# Patient Record
Sex: FEMALE | Race: WHITE | NOT HISPANIC OR LATINO | Employment: FULL TIME | URBAN - METROPOLITAN AREA
[De-identification: names, ages, dates, MRNs, and addresses within clinical notes are randomized per-mention and may not be internally consistent; named-entity substitution may affect disease eponyms.]

---

## 2023-03-02 PROBLEM — R32 URINARY INCONTINENCE: Status: ACTIVE | Noted: 2023-03-02

## 2023-03-02 PROBLEM — R32 INCONTINENCE: Status: ACTIVE | Noted: 2023-03-02

## 2023-03-02 PROBLEM — Z20.822 SUSPECTED COVID-19 VIRUS INFECTION: Status: ACTIVE | Noted: 2023-03-02

## 2023-03-02 PROBLEM — R92.1 BREAST CALCIFICATIONS: Status: ACTIVE | Noted: 2023-03-02

## 2023-03-02 PROBLEM — H52.203 ASTIGMATISM, BILATERAL: Status: ACTIVE | Noted: 2023-03-02

## 2023-03-02 PROBLEM — B34.9 VIRAL ILLNESS: Status: ACTIVE | Noted: 2023-03-02

## 2023-03-02 PROBLEM — E55.9 VITAMIN D INSUFFICIENCY: Status: ACTIVE | Noted: 2023-03-02

## 2023-03-02 PROBLEM — U07.1 COVID-19 VIRUS INFECTION: Status: ACTIVE | Noted: 2023-03-02

## 2023-03-02 PROBLEM — G56.02 LEFT CARPAL TUNNEL SYNDROME: Status: ACTIVE | Noted: 2023-03-02

## 2023-03-02 PROBLEM — G43.909 MIGRAINE: Status: ACTIVE | Noted: 2023-03-02

## 2023-03-02 PROBLEM — R31.29 MICROSCOPIC HEMATURIA: Status: ACTIVE | Noted: 2023-03-02

## 2023-03-02 PROBLEM — F41.9 ANXIETY: Status: ACTIVE | Noted: 2023-03-02

## 2023-03-02 PROBLEM — E53.8 VITAMIN B12 DEFICIENCY: Status: ACTIVE | Noted: 2023-03-02

## 2023-03-02 PROBLEM — R05.9 COUGH: Status: ACTIVE | Noted: 2023-03-02

## 2023-03-02 PROBLEM — S16.1XXA NECK STRAIN: Status: ACTIVE | Noted: 2023-03-02

## 2023-03-02 PROBLEM — R11.2 NAUSEA WITH VOMITING: Status: ACTIVE | Noted: 2023-03-02

## 2023-03-02 PROBLEM — G47.33 SEVERE OBSTRUCTIVE SLEEP APNEA: Status: ACTIVE | Noted: 2023-03-02

## 2023-03-02 PROBLEM — E66.2 CLASS 2 OBESITY WITH ALVEOLAR HYPOVENTILATION AND BODY MASS INDEX (BMI) OF 38.0 TO 38.9 IN ADULT (MULTI): Status: ACTIVE | Noted: 2023-03-02

## 2023-03-02 PROBLEM — K51.211 ULCERATIVE PROCTITIS WITH RECTAL BLEEDING (MULTI): Status: ACTIVE | Noted: 2023-03-02

## 2023-03-02 PROBLEM — E66.9 OBESITY, CLASS II, BMI 35-39.9: Status: ACTIVE | Noted: 2023-03-02

## 2023-03-02 PROBLEM — E78.5 DYSLIPIDEMIA: Status: ACTIVE | Noted: 2023-03-02

## 2023-03-02 PROBLEM — N91.2 AMENORRHEA: Status: ACTIVE | Noted: 2023-03-02

## 2023-03-02 PROBLEM — H52.223 MYOPIA OF BOTH EYES WITH REGULAR ASTIGMATISM: Status: ACTIVE | Noted: 2023-03-02

## 2023-03-02 PROBLEM — I10 HBP (HIGH BLOOD PRESSURE): Status: ACTIVE | Noted: 2023-03-02

## 2023-03-02 PROBLEM — H52.13 MYOPIA OF BOTH EYES WITH REGULAR ASTIGMATISM: Status: ACTIVE | Noted: 2023-03-02

## 2023-03-02 PROBLEM — R92.8 ABNORMAL MAMMOGRAM: Status: ACTIVE | Noted: 2023-03-02

## 2023-03-02 PROBLEM — R09.81 SINUS CONGESTION: Status: ACTIVE | Noted: 2023-03-02

## 2023-03-02 PROBLEM — E66.812 CLASS 2 OBESITY WITH ALVEOLAR HYPOVENTILATION AND BODY MASS INDEX (BMI) OF 38.0 TO 38.9 IN ADULT: Status: ACTIVE | Noted: 2023-03-02

## 2023-03-02 PROBLEM — R63.5 WEIGHT GAIN: Status: ACTIVE | Noted: 2023-03-02

## 2023-03-02 PROBLEM — N39.0 UTI (URINARY TRACT INFECTION): Status: ACTIVE | Noted: 2023-03-02

## 2023-03-02 PROBLEM — G47.30 SLEEP DISORDER BREATHING: Status: ACTIVE | Noted: 2023-03-02

## 2023-03-02 PROBLEM — G56.03 BILATERAL CARPAL TUNNEL SYNDROME: Status: ACTIVE | Noted: 2023-03-02

## 2023-03-02 PROBLEM — R20.2 PARESTHESIA OF ARM: Status: ACTIVE | Noted: 2023-03-02

## 2023-03-02 PROBLEM — E88.810 METABOLIC SYNDROME: Status: ACTIVE | Noted: 2023-03-02

## 2023-03-02 PROBLEM — H10.13 ALLERGIC CONJUNCTIVITIS OF BOTH EYES: Status: ACTIVE | Noted: 2023-03-02

## 2023-03-02 PROBLEM — T14.8XXA PULLED MUSCLE: Status: ACTIVE | Noted: 2023-03-02

## 2023-03-02 PROBLEM — R92.2 DENSE BREASTS: Status: ACTIVE | Noted: 2023-03-02
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
## 2023-03-02 PROBLEM — N60.01 BREAST CYST, RIGHT: Status: ACTIVE | Noted: 2023-03-02

## 2023-03-02 PROBLEM — S63.501A SPRAIN OF RIGHT WRIST: Status: ACTIVE | Noted: 2023-03-02

## 2023-03-02 PROBLEM — E66.812 OBESITY, CLASS II, BMI 35-39.9: Status: ACTIVE | Noted: 2023-03-02

## 2023-03-02 PROBLEM — R53.83 FATIGUE: Status: ACTIVE | Noted: 2023-03-02

## 2023-03-02 PROBLEM — R92.30 DENSE BREASTS: Status: ACTIVE | Noted: 2023-03-02

## 2023-03-02 PROBLEM — J10.1 INFLUENZA A: Status: ACTIVE | Noted: 2023-03-02

## 2023-03-02 PROBLEM — M54.50 LOW BACK PAIN, EPISODIC: Status: ACTIVE | Noted: 2023-03-02

## 2023-03-02 PROBLEM — M47.812 OSTEOARTHRITIS CERVICAL SPINE: Status: ACTIVE | Noted: 2023-03-02

## 2023-03-02 PROBLEM — B34.9 VIRAL ILLNESS: Status: RESOLVED | Noted: 2023-03-02 | Resolved: 2023-03-02

## 2023-03-02 PROBLEM — J06.9 URI (UPPER RESPIRATORY INFECTION): Status: ACTIVE | Noted: 2023-03-02

## 2023-03-02 PROBLEM — E78.1 HYPERTRIGLYCERIDEMIA: Status: ACTIVE | Noted: 2023-03-02

## 2023-03-02 PROBLEM — M54.9 BACKACHE: Status: ACTIVE | Noted: 2023-03-02

## 2023-03-02 RX ORDER — POTASSIUM CHLORIDE 20 MEQ/1
TABLET, EXTENDED RELEASE ORAL
COMMUNITY
End: 2023-11-29 | Stop reason: WASHOUT

## 2023-03-02 RX ORDER — ATENOLOL 50 MG/1
1 TABLET ORAL DAILY
COMMUNITY
Start: 2018-05-23 | End: 2023-03-17 | Stop reason: SDUPTHER

## 2023-03-02 RX ORDER — CHLORTHALIDONE 25 MG/1
1 TABLET ORAL DAILY
COMMUNITY
Start: 2021-05-05 | End: 2023-03-17 | Stop reason: SDUPTHER

## 2023-03-02 RX ORDER — MESALAMINE 0.38 G/1
CAPSULE, EXTENDED RELEASE ORAL
COMMUNITY
Start: 2019-11-21

## 2023-03-02 RX ORDER — FLUTICASONE PROPIONATE 50 MCG
2 SPRAY, SUSPENSION (ML) NASAL DAILY
COMMUNITY
Start: 2022-10-05 | End: 2024-01-11 | Stop reason: SDUPTHER

## 2023-03-02 RX ORDER — LEVONORGESTREL 52 MG/1
INTRAUTERINE DEVICE INTRAUTERINE
COMMUNITY
Start: 2021-03-23

## 2023-03-02 RX ORDER — BUSPIRONE HYDROCHLORIDE 10 MG/1
1 TABLET ORAL 2 TIMES DAILY
COMMUNITY
Start: 2022-07-27 | End: 2023-11-01 | Stop reason: SDUPTHER

## 2023-03-02 RX ORDER — MESALAMINE 1000 MG/1
SUPPOSITORY RECTAL
COMMUNITY
Start: 2021-02-15

## 2023-03-02 RX ORDER — RIZATRIPTAN BENZOATE 10 MG/1
TABLET ORAL
COMMUNITY
Start: 2021-05-05

## 2023-03-02 RX ORDER — TOPIRAMATE 50 MG/1
TABLET, FILM COATED ORAL
COMMUNITY
Start: 2022-10-11

## 2023-03-02 RX ORDER — CHOLECALCIFEROL (VITAMIN D3) 50 MCG
1 TABLET ORAL DAILY
COMMUNITY
End: 2023-03-17 | Stop reason: SDUPTHER

## 2023-03-02 RX ORDER — HYDROXYZINE HYDROCHLORIDE 10 MG/1
10 TABLET, FILM COATED ORAL 2 TIMES DAILY
COMMUNITY
Start: 2022-03-25

## 2023-03-17 DIAGNOSIS — E55.9 VITAMIN D DEFICIENCY: ICD-10-CM

## 2023-03-17 DIAGNOSIS — G43.809 OTHER MIGRAINE WITHOUT STATUS MIGRAINOSUS, NOT INTRACTABLE: ICD-10-CM

## 2023-03-17 DIAGNOSIS — I10 HYPERTENSION, UNSPECIFIED TYPE: Primary | ICD-10-CM

## 2023-03-19 RX ORDER — ATENOLOL 50 MG/1
50 TABLET ORAL DAILY
Qty: 30 TABLET | Refills: 2 | Status: SHIPPED | OUTPATIENT
Start: 2023-03-19 | End: 2023-11-29 | Stop reason: WASHOUT

## 2023-03-19 RX ORDER — CHOLECALCIFEROL (VITAMIN D3) 50 MCG
50 TABLET ORAL DAILY
Qty: 30 TABLET | Refills: 2 | Status: SHIPPED | OUTPATIENT
Start: 2023-03-19 | End: 2023-04-18

## 2023-03-19 RX ORDER — CHLORTHALIDONE 25 MG/1
25 TABLET ORAL DAILY
Qty: 30 TABLET | Refills: 1 | Status: SHIPPED | OUTPATIENT
Start: 2023-03-19 | End: 2023-11-29 | Stop reason: WASHOUT

## 2023-04-24 ENCOUNTER — HOSPITAL ENCOUNTER (OUTPATIENT)
Dept: DATA CONVERSION | Facility: HOSPITAL | Age: 48
End: 2023-04-24
Attending: ORTHOPAEDIC SURGERY | Admitting: ORTHOPAEDIC SURGERY
Payer: COMMERCIAL

## 2023-04-24 DIAGNOSIS — I10 ESSENTIAL (PRIMARY) HYPERTENSION: ICD-10-CM

## 2023-04-24 DIAGNOSIS — E66.9 OBESITY, UNSPECIFIED: ICD-10-CM

## 2023-04-24 DIAGNOSIS — G56.01 CARPAL TUNNEL SYNDROME, RIGHT UPPER LIMB: ICD-10-CM

## 2023-04-24 DIAGNOSIS — G47.33 OBSTRUCTIVE SLEEP APNEA (ADULT) (PEDIATRIC): ICD-10-CM

## 2023-05-24 ENCOUNTER — OFFICE VISIT (OUTPATIENT)
Dept: PRIMARY CARE | Facility: CLINIC | Age: 48
End: 2023-05-24
Payer: COMMERCIAL

## 2023-05-24 VITALS
SYSTOLIC BLOOD PRESSURE: 128 MMHG | DIASTOLIC BLOOD PRESSURE: 70 MMHG | WEIGHT: 233 LBS | HEART RATE: 72 BPM | TEMPERATURE: 97.5 F | HEIGHT: 66 IN | RESPIRATION RATE: 16 BRPM | BODY MASS INDEX: 37.45 KG/M2

## 2023-05-24 DIAGNOSIS — G56.01 CARPAL TUNNEL SYNDROME OF RIGHT WRIST: Primary | ICD-10-CM

## 2023-05-24 PROBLEM — J01.90 SINUSITIS, ACUTE: Status: ACTIVE | Noted: 2023-05-24

## 2023-05-24 PROBLEM — J20.9 BRONCHITIS, ACUTE: Status: ACTIVE | Noted: 2023-05-24

## 2023-05-24 PROBLEM — E87.6 HYPOKALEMIA: Status: ACTIVE | Noted: 2023-05-24

## 2023-05-24 PROCEDURE — 3074F SYST BP LT 130 MM HG: CPT | Performed by: INTERNAL MEDICINE

## 2023-05-24 PROCEDURE — 99213 OFFICE O/P EST LOW 20 MIN: CPT | Performed by: INTERNAL MEDICINE

## 2023-05-24 PROCEDURE — 3008F BODY MASS INDEX DOCD: CPT | Performed by: INTERNAL MEDICINE

## 2023-05-24 PROCEDURE — 3078F DIAST BP <80 MM HG: CPT | Performed by: INTERNAL MEDICINE

## 2023-05-24 RX ORDER — HYDROCODONE BITARTRATE AND ACETAMINOPHEN 5; 325 MG/1; MG/1
TABLET ORAL
COMMUNITY
Start: 2023-04-24 | End: 2023-11-29 | Stop reason: WASHOUT

## 2023-05-24 RX ORDER — CHOLECALCIFEROL (VITAMIN D3) 50 MCG
1 TABLET ORAL DAILY
COMMUNITY
End: 2024-06-10 | Stop reason: SDUPTHER

## 2023-05-24 RX ORDER — UBIDECARENONE 75 MG
1 CAPSULE ORAL DAILY
COMMUNITY
Start: 2022-10-29 | End: 2024-06-10 | Stop reason: SDUPTHER

## 2023-05-24 ASSESSMENT — ENCOUNTER SYMPTOMS
DYSURIA: 0
FATIGUE: 1
ABDOMINAL PAIN: 0
DIARRHEA: 0
PALPITATIONS: 0
DIZZINESS: 0
CONFUSION: 0
HEADACHES: 1
NAUSEA: 0
WEAKNESS: 0
VOMITING: 0
SHORTNESS OF BREATH: 0
ADENOPATHY: 0
CHEST TIGHTNESS: 0
SORE THROAT: 0
ARTHRALGIAS: 0
JOINT SWELLING: 0
COUGH: 0
CHILLS: 0
CARDIOVASCULAR NEGATIVE: 1
CONSTIPATION: 0
UNEXPECTED WEIGHT CHANGE: 0
SLEEP DISTURBANCE: 0
WHEEZING: 0
RESPIRATORY NEGATIVE: 1

## 2023-05-24 ASSESSMENT — PATIENT HEALTH QUESTIONNAIRE - PHQ9
1. LITTLE INTEREST OR PLEASURE IN DOING THINGS: NOT AT ALL
2. FEELING DOWN, DEPRESSED OR HOPELESS: NOT AT ALL
SUM OF ALL RESPONSES TO PHQ9 QUESTIONS 1 AND 2: 0

## 2023-05-24 NOTE — PROGRESS NOTES
Subjective   Tanisha Stewart is a 47 y.o. female who presents for Follow-up (R hand surgery follow up 4.24.2023).  Patient here today follow up R hand carpal tunnel SX - 4.24.2023, she had off work since SX till 5.23.2023, went to see surgeon 5.17.23 , was telling him the hand still hurting and has some discharge at that time still had 3 stitches , today  is only 1 left and the surgeon to9d patient she could go9 back to work starting yesterday 5.23.2023, patient told him can not go yet due to still has numbness ,sore, and she can  not pull or lift heavy weights at work as it is her job requirements , dr told patient can give her return to work  with lifting restrictions but patient states her edna does not have light duty , at work the HR and manager asked patient if she feels she can hurt herself by pulling heavy stuff , and patient  answer yes, she does not fell enough healed to be able to work with heavy objects. Work place  advise patient to see the PCP for more week  work excuse     Is crying, is afraid to go back to work and hurt her back or something because she is still has pain  and she needs to lift and work with that hand. Will refer to PT/OT for eval  Review of Systems   Constitutional:  Positive for fatigue. Negative for chills and unexpected weight change.        Comment   HENT:  Negative for congestion, ear pain and sore throat.    Respiratory: Negative.  Negative for cough, chest tightness, shortness of breath and wheezing.    Cardiovascular: Negative.  Negative for palpitations and leg swelling.   Gastrointestinal:  Negative for abdominal pain, constipation, diarrhea, nausea and vomiting.   Genitourinary:  Negative for dysuria and urgency.   Musculoskeletal:  Negative for arthralgias and joint swelling.   Skin:  Negative for rash.   Neurological:  Positive for headaches. Negative for dizziness and weakness.   Hematological:  Negative for adenopathy.   Psychiatric/Behavioral:  Negative for confusion  "and sleep disturbance.        Objective   Physical Exam  Constitutional:       Appearance: Normal appearance.      Comments: Post sx scar and mild swelling around , pain to palpation , no signs of infection, no stiches   HENT:      Head: Normocephalic and atraumatic.   Eyes:      Pupils: Pupils are equal, round, and reactive to light.   Cardiovascular:      Rate and Rhythm: Normal rate and regular rhythm.   Pulmonary:      Effort: Pulmonary effort is normal.      Breath sounds: Normal breath sounds.   Musculoskeletal:         General: Normal range of motion.      Cervical back: Normal range of motion and neck supple.   Skin:     General: Skin is warm.   Neurological:      General: No focal deficit present.      Mental Status: She is alert and oriented to person, place, and time.   Psychiatric:         Mood and Affect: Mood normal.         Behavior: Behavior normal.       /70 (BP Location: Left arm, Patient Position: Sitting)   Pulse 72   Temp 36.4 °C (97.5 °F)   Resp 16   Ht 1.676 m (5' 6\")   Wt 106 kg (233 lb)   BMI 37.61 kg/m²       Assessment/Plan   Problem List Items Addressed This Visit    None      "

## 2023-05-31 ENCOUNTER — TELEPHONE (OUTPATIENT)
Dept: PRIMARY CARE | Facility: CLINIC | Age: 48
End: 2023-05-31
Payer: COMMERCIAL

## 2023-05-31 NOTE — TELEPHONE ENCOUNTER
5/31/23: I spoke to Joleen at Dr. Cheng's office after faxing them a copy of the OT evaluation.  She confirmed that they will in fact write a letter for the Pt and that this is their responsibility because Dr. Cheng is her surgeon.  They said we do not need to do anything further. I messaged the patient via [facility] to explain this to her.

## 2023-05-31 NOTE — TELEPHONE ENCOUNTER
Hi Elinor, I just got off the phone with Dr. Ramos and he said that they do not write any work notes that would be up to my primary doctor. All they can do is tell the primary doctor my prognosis and right now my prognosis is that I cannot return to work because of my weak hand and not able to perform my job . When I gave my results to my employer, he told me not to return until my hand is stronger so I need a letter from my primary showing that I am still out of work until my hand gets stronger. I am doing the excersises Dr. Ramos  gave me 5x a day to do . He says that you can call him and he will explain that to you  why she needs to extend my time . Once my therapy is complete and my percentage is where it needs to be than a form needs to be completed saying that I can return to work. This is what [employer] is asking me to do.    I hope this helps      ----- Message -----       From:Tanisha Stewart       Sent:5/31/2023  3:24 PM EDT         To:User Message Message List    Subject:Occupational Therapy    This whole thing has been nothing but a nightmare back-and-forth from one doctor to Another.  one says go to this doctor. The other ones says  to go to this doctor. My surgeon has been a total idiot, saying that there is nothing wrong with my hand he tells me to go to my primary my primary sends me to an occupational therapist. My occupational therapist tells me to come back to my primary my primary tells me to go back to my occupational therapist. I mean I just don’t understand what the problem is. I told my employment what the results from the therapist were and he  told me not to return until I can lift up the amount that I am suppose to .I’m doing everything that everybody’s telling me to do. Obviously there’s a problem with my hands and I can’t do my work. so you suggesting I called  again

## 2023-05-31 NOTE — TELEPHONE ENCOUNTER
Good morning just wanted to let you know that I did go to my appointment this morning. He did some work with me on my hand and I could lift up to 17 pounds with my right hand. Needs a lot of work to be done. He did say that I do have some fluid in my hand and he does see that I do have a lot of pain and need to do some work on my hand. He gave me some excersizes to do . I’m scheduled in two weeks to go back . didn’t really say if I. I should stay out of work. I did tell him that I have a return to work letter to go back on Monday. I am wearing a brace so I guess we’ll see how it goes.  If  Dr. NORTON thinks I should go back on Monday then I will go back on Monday but I will continue with the therapy until my hand is 100% stronger . She can see his follow up notes in my chart if needed .   Good morning I talk to my employer at [employer] and he’s concerned about me returning to work on Monday because of the results from the occupational therapist stating that I am not able to lift more than 18 pounds on my right hand. I contacted you right after my appointment on Want me returning to work unless I am able to lift properly with both hands with that being said I need an excuse either from you or the therapist stating I can’t go back to work until my hands are ready to work. Do I get that from you or do I get that from Dr. Ramos please advise you can also see Dr. Newby notes from my therapy session. I am also seeing him on June 6.

## 2023-06-02 ENCOUNTER — TELEPHONE (OUTPATIENT)
Dept: PRIMARY CARE | Facility: CLINIC | Age: 48
End: 2023-06-02
Payer: COMMERCIAL

## 2023-06-02 NOTE — TELEPHONE ENCOUNTER
My apologies it’s May 30- June 5 for the new excuse  . She excused  me from May 24-May 29 and referred me to Dr RAMOS .         Tanisha Stewart  You14 hours ago (8:07 PM)       Good evening      After contacting Dr. Cheng, his assistant has been very rude and All she kept saying was that I should’ve gone back to work, and [employer] should have put me on a light load and taking care of me assisted what  I needed and I kept explaining to her that I could not return to work on a light duty or anything with restrictions. first of all, they would not let me come back to work and second of all, I can’t come to work, and then leave off of work with an injury, which made no sense at all. when I go to see Dr. Ramos on June 6 I’m hoping my hand will be where it needs to be and I’m hoping he will send me back to work on June 7. Is there anyway Dr. Zabala  can help me excuse that time since she excuse my first week and is aware of the situation, I am tired of going back-and-forth from one doctor another I was told to see my primary doctor which I did, and she is aware of my situation. She sent me to occupational therapy that therapist will do nothing for me. He says for me to see my primary doctor , at this point, I’m not sure what to do Dr. NORTON  was already aware of my situation. At this point I do need to get back to work and I can’t keep going back-and-forth with all these doctors I just need somebody to give me an excuse for May 29, [place] 5 and on June 6. I will see Dr. Ramos hope I can lift up to 65 pounds and go back to work whatever you can do whatever Dr. allison do. I’d appreciate it. Have a good evening.

## 2023-06-08 ENCOUNTER — TELEPHONE (OUTPATIENT)
Dept: PRIMARY CARE | Facility: CLINIC | Age: 48
End: 2023-06-08
Payer: COMMERCIAL

## 2023-06-08 NOTE — TELEPHONE ENCOUNTER
We received [facility] Paperwork form the Patient for her absences from work May 29 to June 6th.  I spoke to Joleen at Dr. Cheng's office. Joleen explained that She spoke to the patient's employer [employer] and told them that Dr. Cheng ( the surgeon ) cleared The patient to return to work on May 23rd with no use of her hands.  [employer] did say that Tanisha is not allowed to come to work if she cannot use her hands.  [employer]oleen told [employer] that is on them whether or not they want to allow her to return to work: Dr. Cheng is not allowed to say anything more than she can return to work without use of hands. Joleen told me she spoke to the patient on 6/7/23 and that the patient said she was having the OT send a letter.  Instead the patient sent us [facility] paperwork.  Joleen said they will give her a letter stating she was clear to return to work 5/23/23 without the use of her hands and that they will not fill out any paperwork. The patient has been aware of this from the start.

## 2023-06-12 ENCOUNTER — TELEPHONE (OUTPATIENT)
Dept: PRIMARY CARE | Facility: CLINIC | Age: 48
End: 2023-06-12
Payer: COMMERCIAL

## 2023-06-12 NOTE — TELEPHONE ENCOUNTER
Dalton Aldrich, as I suspected this is what I got back from [facility] the forms do you need to be completed all Dr. Kline hast to do is fill out the proper steps bypass the part that says FMLA because it does not apply to me and state the same things that I had to see her go to my OT and just fax with [employer]. That’s all she needs to do. I don’t understand why she’s having a problem with it. She can also contact [facility] if she has any questions it Jose to be on their format of paperwork in order for them to except it. This is the reply that I got back from [facility], can she please just complete the form and fax it back    Attachments   [id number]       Katia Beltran CMA routed conversation to You; Jean Van MD3 days ago     Tanisha HUANG Do [id number] [id number] Clinical Support Staff (supporting You)3 days ago       Thank you and just to make a correction to your notes I am not on FMLA paperwork  and I did not request or send you  FMLA at [facility]. I am on medical leave. It’s called [facility]. I never have requested FMLA. I don’t have enough hours for it if Sagrario would have read the paperwork correctly, it is an option that you would have checkmarked on it so if Dr. Kline would’ve read the paperwork correctly it was an option to check on it if I was on FMLA which I was not . Needless to say it’s a shame that it had to come to this point all she had to do is read the paperwork and skip over the part  that said FMLA, which does not pertain to me anyways, I’m hoping that [facility] will except her letter and we could be done with this .     Thank you        You  Tanisha Rolles3 days ago     MELLISSA Garay,     I just emailed you a copy.      Elinor Altamirano CMA routed conversation to You3 days ago     Tanisha HUANG Do [id number] Staff (supporting You)3 days ago       PleSe email a copy of the letter so I can print it      Thank you

## 2023-06-12 NOTE — TELEPHONE ENCOUNTER
Tanisha,     Please see below for Dr. Van's final response.         Jean Van MD  YouJust now (12:15 PM)        Dalton Garay,  I am not going to go back and forth on this anymore. I am not filing out the papers because is asking me to say more then is medically necessary for me to say.  The OT evaluation , I reviewed both of them at the time, and doesn't say that you should stay home only what you are able to do and what is the treatment to get better and that was the reason I sent you to OT.  The company is suppose to accommodate your work until you are able to do the prior work in full.  Sorry but this is all that I can say.

## 2023-06-12 NOTE — TELEPHONE ENCOUNTER
Good Morning      I cannot understand why she doesn’t understand . The surgeon case it closed . There are 2 cases  that we weren’t aware , the surgery closes on the 5/23 when they didn’t want to extend me . When I saw her on the 5/24 new case opens thats all they care about is why I came to see you , that you sent me to OT , OT evaluated me you saw the notes and they allowed me to return on 6/6. They told me all this was sent to kim Zabala to review , so why is it so hard for her to review the same thing she wrote on her letter head ,on the [employer] form and fax it in ? This is a completely inappropriate and very frustrating that a Dr cannot corporate with a simple request . [employer] can’t accept the format of the letter it has to be on their paper work and it’s not FMLA I sent a phone number to call maybe they can explain it better so we can clear this up

## 2023-06-12 NOTE — TELEPHONE ENCOUNTER
Message forwarded to flip Garay,     Please read below for Dr. Van's message:      Jean Van MD  You1 minute ago (11:14 AM)        The form  would prompt me to say that she couldn't work during that time.  1) I can't say that, the OT glenn is saying that she couldn't lift at 100 % at the first visit , not that she couldn't work at all.  So ,for me , stating that from medical reasons I prohibit her to work is not true.  I gave her few days off until she has been evaluated but after that I didn't say she should stay home, her employment should  accommodate her for the work she could do.  2) this is not a condition that I took care off, is an other doctor , so the issue should be solved by the physician who was giving the treatment ( surgery). Not me. I just got in the middle trying to help.  3)  I gave you the letter stating the facts , I can't fill out a form asking me to say more than I can say from a medical point of view.       This  MyChart message has not been read.

## 2023-07-19 LAB
ALANINE AMINOTRANSFERASE (SGPT) (U/L) IN SER/PLAS: 13 U/L (ref 7–45)
ALBUMIN (G/DL) IN SER/PLAS: 4.2 G/DL (ref 3.4–5)
ALKALINE PHOSPHATASE (U/L) IN SER/PLAS: 48 U/L (ref 33–110)
ANION GAP IN SER/PLAS: 10 MMOL/L (ref 10–20)
ASPARTATE AMINOTRANSFERASE (SGOT) (U/L) IN SER/PLAS: 25 U/L (ref 9–39)
BASOPHILS (10*3/UL) IN BLOOD BY AUTOMATED COUNT: 0.12 X10E9/L (ref 0–0.1)
BASOPHILS/100 LEUKOCYTES IN BLOOD BY AUTOMATED COUNT: 1.5 % (ref 0–2)
BILIRUBIN TOTAL (MG/DL) IN SER/PLAS: 0.7 MG/DL (ref 0–1.2)
CALCIDIOL (25 OH VITAMIN D3) (NG/ML) IN SER/PLAS: 28 NG/ML
CALCIUM (MG/DL) IN SER/PLAS: 8.8 MG/DL (ref 8.6–10.3)
CARBON DIOXIDE, TOTAL (MMOL/L) IN SER/PLAS: 25 MMOL/L (ref 21–32)
CHLORIDE (MMOL/L) IN SER/PLAS: 109 MMOL/L (ref 98–107)
CHOLESTEROL (MG/DL) IN SER/PLAS: 176 MG/DL (ref 0–199)
CHOLESTEROL IN HDL (MG/DL) IN SER/PLAS: 46.7 MG/DL
CHOLESTEROL/HDL RATIO: 3.8
COBALAMIN (VITAMIN B12) (PG/ML) IN SER/PLAS: 190 PG/ML (ref 211–911)
CREATININE (MG/DL) IN SER/PLAS: 1 MG/DL (ref 0.5–1.05)
EOSINOPHILS (10*3/UL) IN BLOOD BY AUTOMATED COUNT: 0.19 X10E9/L (ref 0–0.7)
EOSINOPHILS/100 LEUKOCYTES IN BLOOD BY AUTOMATED COUNT: 2.4 % (ref 0–6)
ERYTHROCYTE DISTRIBUTION WIDTH (RATIO) BY AUTOMATED COUNT: 13 % (ref 11.5–14.5)
ERYTHROCYTE MEAN CORPUSCULAR HEMOGLOBIN CONCENTRATION (G/DL) BY AUTOMATED: 32.2 G/DL (ref 32–36)
ERYTHROCYTE MEAN CORPUSCULAR VOLUME (FL) BY AUTOMATED COUNT: 89 FL (ref 80–100)
ERYTHROCYTES (10*6/UL) IN BLOOD BY AUTOMATED COUNT: 4.69 X10E12/L (ref 4–5.2)
ESTIMATED AVERAGE GLUCOSE FOR HBA1C: 105 MG/DL
GFR FEMALE: 69 ML/MIN/1.73M2
GLUCOSE (MG/DL) IN SER/PLAS: 93 MG/DL (ref 74–99)
HEMATOCRIT (%) IN BLOOD BY AUTOMATED COUNT: 41.6 % (ref 36–46)
HEMOGLOBIN (G/DL) IN BLOOD: 13.4 G/DL (ref 12–16)
HEMOGLOBIN A1C/HEMOGLOBIN TOTAL IN BLOOD: 5.3 %
IMMATURE GRANULOCYTES/100 LEUKOCYTES IN BLOOD BY AUTOMATED COUNT: 0.1 % (ref 0–0.9)
LDL: 110 MG/DL (ref 0–99)
LEUKOCYTES (10*3/UL) IN BLOOD BY AUTOMATED COUNT: 8.1 X10E9/L (ref 4.4–11.3)
LYMPHOCYTES (10*3/UL) IN BLOOD BY AUTOMATED COUNT: 2.93 X10E9/L (ref 1.2–4.8)
LYMPHOCYTES/100 LEUKOCYTES IN BLOOD BY AUTOMATED COUNT: 36.3 % (ref 13–44)
MONOCYTES (10*3/UL) IN BLOOD BY AUTOMATED COUNT: 0.58 X10E9/L (ref 0.1–1)
MONOCYTES/100 LEUKOCYTES IN BLOOD BY AUTOMATED COUNT: 7.2 % (ref 2–10)
NEUTROPHILS (10*3/UL) IN BLOOD BY AUTOMATED COUNT: 4.25 X10E9/L (ref 1.2–7.7)
NEUTROPHILS/100 LEUKOCYTES IN BLOOD BY AUTOMATED COUNT: 52.5 % (ref 40–80)
PLATELETS (10*3/UL) IN BLOOD AUTOMATED COUNT: 344 X10E9/L (ref 150–450)
POTASSIUM (MMOL/L) IN SER/PLAS: 3.6 MMOL/L (ref 3.5–5.3)
PROTEIN TOTAL: 7.2 G/DL (ref 6.4–8.2)
SODIUM (MMOL/L) IN SER/PLAS: 140 MMOL/L (ref 136–145)
THYROTROPIN (MIU/L) IN SER/PLAS BY DETECTION LIMIT <= 0.05 MIU/L: 1.92 MIU/L (ref 0.44–3.98)
TRIGLYCERIDE (MG/DL) IN SER/PLAS: 95 MG/DL (ref 0–149)
UREA NITROGEN (MG/DL) IN SER/PLAS: 19 MG/DL (ref 6–23)
VLDL: 19 MG/DL (ref 0–40)

## 2023-08-02 ENCOUNTER — APPOINTMENT (OUTPATIENT)
Dept: PRIMARY CARE | Facility: CLINIC | Age: 48
End: 2023-08-02
Payer: COMMERCIAL

## 2023-08-28 LAB
ANION GAP IN SER/PLAS: 11 MMOL/L (ref 10–20)
CALCIUM (MG/DL) IN SER/PLAS: 8.9 MG/DL (ref 8.6–10.3)
CARBON DIOXIDE, TOTAL (MMOL/L) IN SER/PLAS: 32 MMOL/L (ref 21–32)
CHLORIDE (MMOL/L) IN SER/PLAS: 97 MMOL/L (ref 98–107)
CREATININE (MG/DL) IN SER/PLAS: 1.01 MG/DL (ref 0.5–1.05)
GFR FEMALE: 68 ML/MIN/1.73M2
GLUCOSE (MG/DL) IN SER/PLAS: 112 MG/DL (ref 74–99)
MAGNESIUM (MG/DL) IN SER/PLAS: 2.17 MG/DL (ref 1.6–2.4)
POTASSIUM (MMOL/L) IN SER/PLAS: 2.9 MMOL/L (ref 3.5–5.3)
SODIUM (MMOL/L) IN SER/PLAS: 137 MMOL/L (ref 136–145)
UREA NITROGEN (MG/DL) IN SER/PLAS: 21 MG/DL (ref 6–23)

## 2023-09-05 LAB
ANION GAP IN SER/PLAS: 12 MMOL/L (ref 10–20)
CALCIUM (MG/DL) IN SER/PLAS: 8.9 MG/DL (ref 8.6–10.3)
CARBON DIOXIDE, TOTAL (MMOL/L) IN SER/PLAS: 27 MMOL/L (ref 21–32)
CHLORIDE (MMOL/L) IN SER/PLAS: 105 MMOL/L (ref 98–107)
CREATININE (MG/DL) IN SER/PLAS: 0.86 MG/DL (ref 0.5–1.05)
GFR FEMALE: 83 ML/MIN/1.73M2
GLUCOSE (MG/DL) IN SER/PLAS: 86 MG/DL (ref 74–99)
MAGNESIUM (MG/DL) IN SER/PLAS: 1.96 MG/DL (ref 1.6–2.4)
POTASSIUM (MMOL/L) IN SER/PLAS: 3.5 MMOL/L (ref 3.5–5.3)
SODIUM (MMOL/L) IN SER/PLAS: 140 MMOL/L (ref 136–145)
UREA NITROGEN (MG/DL) IN SER/PLAS: 12 MG/DL (ref 6–23)

## 2023-09-14 NOTE — H&P
History of Present Illness:   Pregnant/Lactating:  ·  Are You Pregnant no   ·  Are You Currently Breastfeeding no     History Present Illness:  Reason for surgery: Right carpal tunnel syndrome   HPI:    47-year-old female here for right carpal tunnel release with Dr. Cheng    Allergies:        Allergies:  ·  No Known Allergies :     Home Medication Review:   Home Medications Reviewed: yes     Impression/Procedure:   ·  Impression and Planned Procedure: right carpal tunnel release       ERAS (Enhanced Recovery After Surgery):  ·  ERAS Patient: no     Review of Systems:   Review of Systems:  Constitutional: NEGATIVE: Chills     Eyes: NEGATIVE: Diploplia     ENMT: NEGATIVE: Nasal Congestion     Respiratory: NEGATIVE: Hemoptysis     Cardiac: NEGATIVE: Dyspnea on Exertion     Gastrointestinal: NEGATIVE: Vomiting     Genitourinary: NEGATIVE: Flank Pain     Musculoskeletal: POSITIVE: Pain, Stiffness; NEGATIVE:  Decreased ROM, Swelling, Weakness     Neurological: NEGATIVE: Confusion     Psychiatric: NEGATIVE: Anxiety     Skin: NEGATIVE: Rash     Endocrine: NEGATIVE: Cold Intolerance     Hematologic/Lymph: NEGATIVE: Easy Bleeding     Allergic/Immunologic: NEGATIVE: Anaphylaxis         Physical Exam by System:    Constitutional: NAD, appropriate mood   Eyes: PERRL, EOMI, clear sclera   ENMT: mucous membranes moist, no apparent injury,  no lesions seen   Head/Neck: Neck supple, no apparent injury, trachea  midline   Respiratory/Thorax: NWOB   Cardiovascular: RRR on peripheral palpation   Musculoskeletal: intact motors   Neurological: SILT M/R/U ; + Phalen, Durkan   Skin: intact, no rashes     Consent:   COVID-19 Consent:  ·  COVID-19 Risk Consent Surgeon has reviewed key risks related to the risk of rodney COVID-19 and if they contract COVID-19 what the risks are.     Attestation:   Note Completion:  I am a:  Resident/Fellow   Attending Attestation I reviewed the resident/fellow?s documentation and discussed the  patient with the resident/fellow.  I agree with the resident/fellow?s medical  decision making as documented in the note.          Electronic Signatures:  Tej Cheng)  (Signed 24-Apr-2023 07:07)   Authored: Review of Systems, Physical Exam, Note Completion   Co-Signer: History of Present Illness, Allergies, Home Medication Review, Impression/Procedure, ERAS, Physical Exam, Consent, Note Completion  Norberto Baltazar (Resident))  (Signed 24-Apr-2023 06:01)   Authored: History of Present Illness, Allergies, Home  Medication Review, Impression/Procedure, ERAS, Physical Exam, Consent, Note Completion      Last Updated: 24-Apr-2023 07:07 by Tej Cheng)

## 2023-10-02 NOTE — OP NOTE
Post Operative Note:     PreOp Diagnosis: Right carpal tunnel syndrome   Post-Procedure Diagnosis: Same   Procedure: Right carpal tunnel release   Surgeon: Prosper   Resident/Fellow/Other Assistant: None   Anesthesia: MAC sedation plus local   I.V. Fluids: 150 cc LR   Estimated Blood Loss (mL): 2   Blood Replacement: None   Specimen: no   Complications: None immediate   Findings: Tight, thick TCL   Patient Returned To/Condition: PACU in good   Tourniquet Times: 6 minutes at 250 mmHg     Operative Report Dictated:  Dictation: not applicable - note contains Operative  Report   Operative Report:    INDICATION:    Patient is a 47-year-old right-hand dominant woman with numbness and tingling in the median nerve distribution of the right hand that has failed to respond to conservative measures.  She is here for elective right carpal tunnel release, having had good  relief of similar symptoms on the left after surgery by me in the past.  I reminded her of surgical risks of infection; scarring; damage to nerves, tendons, or vessels; stiffness; wound healing problems; failure to relieve symptoms; recurrent symptoms;  and pillar pain.  She wished to proceed.     NARRATIVE:    Following identification of the patient and confirmation of correct site of surgery and signed operative consent, she was brought to the operating room and a hand table affixed to the cart.  A light MAC sedation was administered by Anesthesia along with  IV antibiotic dose.  A pneumatic tourniquet was placed high on the right arm, and the limb was prepped from fingertip to cuff with chlorhexidine, and draped free in the usual sterile fashion.  10 mL of a mix of 0.5% Marcaine and 1% lidocaine plain was  instilled to the planned incision area. The limb was exsanguinated with an Esmarch, and the tourniquet inflated.    A standard 2 cm mini-open carpal tunnel incision was made and taken bluntly down to the palmar fascia, which was divided in line with  its fibers.  Further careful blunt dissection revealed the distal edge of the transverse carpal ligament.  A small metal  skid was placed underneath to protect the median nerve, and the ligament was carefully, sharply, sequentially divided under direct vision.  This was done with scissors.  There was good refill of the longitudinal vessel.  The tourniquet was deflated, and  pink color rapidly returned to all digits.  Meticulous hemostasis was achieved with bipolar.  After final irrigation, skin was closed with 4-0 vicryl subcutaneous and 4-0 Monocryl skin stitch, and a soft dressing applied.  Patient was awakened and transferred  to Recovery in stable condition.      Attestation:   Note Completion:  Attending Attestation I performed the procedure without a resident         Electronic Signatures:  Tej Cheng)  (Signed 24-Apr-2023 09:22)   Authored: Post Operative Note, Note Completion      Last Updated: 24-Apr-2023 09:22 by Tej Cheng)

## 2023-10-05 ENCOUNTER — TELEPHONE (OUTPATIENT)
Dept: PRIMARY CARE | Facility: CLINIC | Age: 48
End: 2023-10-05
Payer: COMMERCIAL

## 2023-10-05 DIAGNOSIS — D22.9 CHANGE IN MOLE: Primary | ICD-10-CM

## 2023-10-18 DIAGNOSIS — F41.9 ANXIETY: Primary | ICD-10-CM

## 2023-10-24 ENCOUNTER — APPOINTMENT (OUTPATIENT)
Dept: RADIOLOGY | Facility: CLINIC | Age: 48
End: 2023-10-24
Payer: COMMERCIAL

## 2023-10-24 DIAGNOSIS — Z12.31 SCREENING MAMMOGRAM FOR BREAST CANCER: ICD-10-CM

## 2023-10-27 RX ORDER — PAROXETINE 10 MG/1
10 TABLET, FILM COATED ORAL DAILY
Qty: 90 TABLET | Refills: 3 | Status: SHIPPED | OUTPATIENT
Start: 2023-10-27 | End: 2024-10-26

## 2023-10-28 ENCOUNTER — PATIENT MESSAGE (OUTPATIENT)
Dept: PRIMARY CARE | Facility: CLINIC | Age: 48
End: 2023-10-28
Payer: COMMERCIAL

## 2023-10-28 DIAGNOSIS — F41.9 ANXIETY: ICD-10-CM

## 2023-10-31 ENCOUNTER — APPOINTMENT (OUTPATIENT)
Dept: RADIOLOGY | Facility: CLINIC | Age: 48
End: 2023-10-31
Payer: COMMERCIAL

## 2023-11-01 RX ORDER — BUSPIRONE HYDROCHLORIDE 10 MG/1
10 TABLET ORAL 2 TIMES DAILY
Qty: 180 TABLET | Refills: 1 | Status: SHIPPED | OUTPATIENT
Start: 2023-11-01 | End: 2024-04-17

## 2023-11-10 ENCOUNTER — DOCUMENTATION (OUTPATIENT)
Dept: SURGERY | Facility: HOSPITAL | Age: 48
End: 2023-11-10
Payer: COMMERCIAL

## 2023-11-14 ENCOUNTER — APPOINTMENT (OUTPATIENT)
Dept: RADIOLOGY | Facility: CLINIC | Age: 48
End: 2023-11-14
Payer: COMMERCIAL

## 2023-11-28 NOTE — PROGRESS NOTES
Diagnoses/Problems  Problem List Items Addressed This Visit    None      Orders  No orders of the defined types were placed in this encounter.       Provider Impressions  1.Mixed UUI  2. microscopic hematuria  3. s/p lynx sling 11/19/18    Today's Visit:  Last seen by myself 12/6/18. 48 y.o. female presents today for recurrent leaking in the last 2 months. She leaks when exercising and with urgency at night. Minimal leaking with exercise, still improved since her sling was placed. She is bothered by her UUI at night. She sometimes leaks with each step and other times loses control of her bladder. She has some urgency during day, but she can make it to the bathroom. DTF x3-4. NTF x1. She stops drinking after 6 p.m., goes to bed at 7:30-8 p.m, and wakes up for work at 2 a.m. She has 1 cup of coffee, drinks ice tea, and drinks water at dinner. She gets up around midnight to urinate. She denies inciting event or pad usage. She works for [employer] in customer service. Patient has an allergy or adverse reaction to Bee pollen. Patient is a non-smoker.    Plan:  We discussed multiple treatment options for her symptoms, including behavioral modifications, PFPT, Bulkamid injections, and bladder medications.     I would like her to complete PFPT. We can consider OAB medications if she would like.    I asked her to follow up with myself in 3 months for re-evaluation or sooner as needed.    Chief Complaint    History Of Present Illness   Testing Results:    1.Mixed UUI  2. microscopic hematuria  3. s/p lynx sling 11/19/18    Today's Visit:  Last seen by myself 12/6/18. 48 y.o. female presents today for recurrent leaking in the last 2 months. She leaks when exercising and with urgency at night. Minimal leaking with exercise, still improved since her sling was placed. She is bothered by her UUI at night. She sometimes leaks with each step and other times loses control of her bladder. She has some urgency during day, but  she can make it to the bathroom. DTF x3-4. NTF x1. She stops drinking after 6 p.m., goes to bed at 7:30-8 p.m, and wakes up for work at 2 a.m. She has 1 cup of coffee, drinks ice tea, and drinks water at dinner. She gets up around midnight to urinate. She denies inciting event or pad usage. She works for [employer] in customer service. Patient has an allergy or adverse reaction to Bee pollen. Patient is a non-smoker.     Review of Systems  Review of Systems   Constitutional: Negative.    HENT: Negative.     Eyes: Negative.    Respiratory: Negative.     Cardiovascular: Negative.    Gastrointestinal: Negative.    Endocrine: Negative.    Genitourinary:         REFER TO HPI   Musculoskeletal: Negative.    Skin: Negative.    Allergic/Immunologic: Negative.    Neurological: Negative.    Hematological: Negative.    Psychiatric/Behavioral: Negative.          Active Problems   Patient Active Problem List   Diagnosis    Abnormal mammogram    Allergic conjunctivitis of both eyes    Amenorrhea    Anxiety    Astigmatism, bilateral    Backache    Bilateral carpal tunnel syndrome    Breast calcifications    Breast cyst, right    Cough    COVID-19 virus infection    Dense breasts    Dyslipidemia    Fatigue    HBP (high blood pressure)    Hypertriglyceridemia    Influenza A    Incontinence    Low back pain, episodic    Metabolic syndrome    Microscopic hematuria    Migraine    Myopia of both eyes with regular astigmatism    Nausea with vomiting    Neck strain    Obesity, Class II, BMI 35-39.9    Paresthesia of arm    Pulled muscle    Sinus congestion    Sleep disorder breathing    Sprain of right wrist    Suspected COVID-19 virus infection    Ulcerative proctitis with rectal bleeding (CMS/HCC)    URI (upper respiratory infection)    UTI (urinary tract infection)    Vitamin B12 deficiency    Vitamin D insufficiency    Weight gain    Urinary incontinence    Work related injury    Osteoarthritis cervical spine    Severe  obstructive sleep apnea    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    Surgical History  She has a past surgical history that includes Other surgical history (05/07/2015); Other surgical history (01/10/2019); and Other surgical history (01/10/2019).     Family History  Family History   Problem Relation Name Age of Onset    Graves' disease Mother      Thyroid disease Mother      Thyroid disease Sister      Diabetes Other grandmother         mellitus    Breast cancer Other grandmother     Lung cancer Other granfather        Social History  She reports that she has never smoked. She uses smokeless tobacco. She reports that she does not currently use alcohol. She reports that she does not currently use drugs.     Allergies  Bee pollen    Current Meds  Current Outpatient Medications:     atenolol (Tenormin) 50 mg tablet, Take 1 tablet (50 mg) by mouth once daily., Disp: 30 tablet, Rfl: 2    busPIRone (Buspar) 10 mg tablet, Take 1 tablet (10 mg) by mouth 2 times a day., Disp: 180 tablet, Rfl: 1    chlorthalidone (Hygroton) 25 mg tablet, Take 1 tablet (25 mg) by mouth once daily., Disp: 30 tablet, Rfl: 1    cholecalciferol (Vitamin D-3) 50 MCG (2000 UT) tablet, Take 1 tablet (50 mcg) by mouth once daily., Disp: , Rfl:     cyanocobalamin (Vitamin B-12) 500 mcg tablet, Take 1 tablet (500 mcg) by mouth once daily., Disp: , Rfl:     fluticasone (Flonase) 50 mcg/actuation nasal spray, Administer 2 sprays into each nostril once daily., Disp: , Rfl:     HYDROcodone-acetaminophen (Norco) 5-325 mg tablet, take 1 tablet by mouth every 4 to 6 hours if needed for severe pain, Disp: , Rfl:     hydrOXYzine HCL (Atarax) 10 mg tablet, Take 1 tablet (10 mg) by mouth twice a day. As needed, Disp: , Rfl:      levonorgestrel (Mirena) 21 mcg/24 hours (8 yrs) 52 mg IUD, Mirena (52 MG) 20 MCG/24HR IUD  Refills: 0      Start : 23-Mar-2021  Active, Disp: , Rfl:     mesalamine (Canasa) 1,000 mg suppository, UNWRAP AND INSERT 1 SUPPOSITORY RECTALLY AT BEDTIME, Disp: , Rfl:     mesalamine ER (Apriso) 0.375 gram 24 hr capsule, TAKE 4 CAPSULES DAILY IN THE MORNING., Disp: , Rfl:     NON FORMULARY, Vitamin B-12 500 MCG Oral tablet, Disp: , Rfl:     PARoxetine (Paxil) 10 mg tablet, Take 1 tablet (10 mg) by mouth once daily., Disp: 90 tablet, Rfl: 3    potassium chloride CR (Klor-Con M20) 20 mEq ER tablet, Take 3 tablets daily while taking topiramate, Disp: , Rfl:     rizatriptan (Maxalt) 10 mg tablet, TAKE 1 TABLET AT ONSET OF HEADACHE. MAY REPEAT EVERY 2 HOURS AS NEEDED. MAXIMUM 3 TABLETS IN 24 HOURS., Disp: , Rfl:     topiramate (Topamax) 50 mg tablet, Take 1 tablet with dinner x 1-2 weeks then increase to 2 tablets with dinner, check labs 4 weeks starting, Disp: , Rfl:     VITAMIN B COMPLEX ORAL, TABS, Disp: , Rfl:     Vitals  Visit Vitals  Smoking Status Never        Physical Exam  NP: Constitutional: alert and in no acute distress, well developed, well nourished.   Head and Face: head and face: unremarkable.   Neck: no neck asymmetry, supple.   Pulmonary: no respiratory distress, unremarkable respiratory effort.   Skin: normal skin color and pigmentation, normal skin turgor, and no rash.   Neurologic: cranial nerves: non-focal, grossly intact.   Psychiatric: alert and oriented x 3.     Sexual Maturity: Normal.   External Genitalia: Unremarkable.   Urethra: Mobility, - CST when sitting.   Vagina: no overcorrection or mesh erosion of the sling  Cervix: unremarkable  POP-Q: Aa: -3 Ba: -3 C: -6 Gh: 3 Pb: 3 TVL: 9 Ap: -3 Bp: -3 D: 8  Kegel: 3/5  Rectum: Unremarkable.   Anus: Unremarkable.   Perianal area: Unremarkable.     String of Mirena visualized.     Results/Data  No results found for this or any previous visit (from the past 12  hour(s)).    Signatures  Scribe Attestation  By signing my name below, I, Anita Gaston Scrtrina   attest that this documentation has been prepared under the direction and in the presence of Asael Bolden MD.

## 2023-11-30 ENCOUNTER — OFFICE VISIT (OUTPATIENT)
Dept: UROLOGY | Facility: CLINIC | Age: 48
End: 2023-11-30
Payer: COMMERCIAL

## 2023-11-30 VITALS
HEART RATE: 79 BPM | TEMPERATURE: 97.7 F | SYSTOLIC BLOOD PRESSURE: 133 MMHG | WEIGHT: 225 LBS | BODY MASS INDEX: 36.16 KG/M2 | DIASTOLIC BLOOD PRESSURE: 83 MMHG | HEIGHT: 66 IN

## 2023-11-30 DIAGNOSIS — N39.46 MIXED INCONTINENCE: Primary | ICD-10-CM

## 2023-11-30 PROCEDURE — 99203 OFFICE O/P NEW LOW 30 MIN: CPT | Performed by: UROLOGY

## 2023-11-30 PROCEDURE — 3079F DIAST BP 80-89 MM HG: CPT | Performed by: UROLOGY

## 2023-11-30 PROCEDURE — 3075F SYST BP GE 130 - 139MM HG: CPT | Performed by: UROLOGY

## 2023-11-30 PROCEDURE — 3008F BODY MASS INDEX DOCD: CPT | Performed by: UROLOGY

## 2023-11-30 ASSESSMENT — ENCOUNTER SYMPTOMS
EYES NEGATIVE: 1
GASTROINTESTINAL NEGATIVE: 1
NEUROLOGICAL NEGATIVE: 1
RESPIRATORY NEGATIVE: 1
CONSTITUTIONAL NEGATIVE: 1
HEMATOLOGIC/LYMPHATIC NEGATIVE: 1
CARDIOVASCULAR NEGATIVE: 1
MUSCULOSKELETAL NEGATIVE: 1
PSYCHIATRIC NEGATIVE: 1
ALLERGIC/IMMUNOLOGIC NEGATIVE: 1
ENDOCRINE NEGATIVE: 1

## 2023-12-12 ENCOUNTER — APPOINTMENT (OUTPATIENT)
Dept: RADIOLOGY | Facility: CLINIC | Age: 48
End: 2023-12-12
Payer: COMMERCIAL

## 2024-01-11 ENCOUNTER — APPOINTMENT (OUTPATIENT)
Dept: PRIMARY CARE | Facility: CLINIC | Age: 49
End: 2024-01-11
Payer: COMMERCIAL

## 2024-01-11 DIAGNOSIS — J30.9 ALLERGIC RHINITIS, UNSPECIFIED SEASONALITY, UNSPECIFIED TRIGGER: ICD-10-CM

## 2024-01-12 RX ORDER — FLUTICASONE PROPIONATE 50 MCG
2 SPRAY, SUSPENSION (ML) NASAL DAILY
Qty: 16 G | Refills: 11 | Status: SHIPPED | OUTPATIENT
Start: 2024-01-12 | End: 2024-05-22

## 2024-01-16 ENCOUNTER — OFFICE VISIT (OUTPATIENT)
Dept: PRIMARY CARE | Facility: CLINIC | Age: 49
End: 2024-01-16
Payer: COMMERCIAL

## 2024-01-16 ENCOUNTER — APPOINTMENT (OUTPATIENT)
Dept: RADIOLOGY | Facility: CLINIC | Age: 49
End: 2024-01-16
Payer: COMMERCIAL

## 2024-01-16 VITALS
DIASTOLIC BLOOD PRESSURE: 98 MMHG | HEIGHT: 66 IN | BODY MASS INDEX: 37.12 KG/M2 | WEIGHT: 231 LBS | SYSTOLIC BLOOD PRESSURE: 144 MMHG | OXYGEN SATURATION: 98 % | TEMPERATURE: 97.3 F | HEART RATE: 77 BPM

## 2024-01-16 DIAGNOSIS — Z86.16 HISTORY OF COVID-19: ICD-10-CM

## 2024-01-16 DIAGNOSIS — J02.9 SORE THROAT: ICD-10-CM

## 2024-01-16 DIAGNOSIS — R05.1 ACUTE COUGH: Primary | ICD-10-CM

## 2024-01-16 PROBLEM — H10.10 ALLERGIC CONJUNCTIVITIS: Status: ACTIVE | Noted: 2024-01-16

## 2024-01-16 PROBLEM — D18.01 HEMANGIOMA OF SKIN AND SUBCUTANEOUS TISSUE: Status: ACTIVE | Noted: 2021-08-09

## 2024-01-16 PROBLEM — L82.1 OTHER SEBORRHEIC KERATOSIS: Status: ACTIVE | Noted: 2021-08-09

## 2024-01-16 PROBLEM — R00.1 BRADYCARDIA: Status: ACTIVE | Noted: 2024-01-16

## 2024-01-16 PROBLEM — R23.2 FLUSHING: Status: ACTIVE | Noted: 2024-01-16

## 2024-01-16 PROBLEM — M79.643 PAIN OF HAND: Status: ACTIVE | Noted: 2024-01-16

## 2024-01-16 PROBLEM — R42 DIZZINESS: Status: ACTIVE | Noted: 2024-01-16

## 2024-01-16 PROBLEM — D22.5 MELANOCYTIC NEVI OF TRUNK: Status: ACTIVE | Noted: 2021-08-09

## 2024-01-16 PROCEDURE — 99213 OFFICE O/P EST LOW 20 MIN: CPT | Performed by: INTERNAL MEDICINE

## 2024-01-16 PROCEDURE — 3077F SYST BP >= 140 MM HG: CPT | Performed by: INTERNAL MEDICINE

## 2024-01-16 PROCEDURE — 3080F DIAST BP >= 90 MM HG: CPT | Performed by: INTERNAL MEDICINE

## 2024-01-16 PROCEDURE — 3008F BODY MASS INDEX DOCD: CPT | Performed by: INTERNAL MEDICINE

## 2024-01-16 RX ORDER — BENZONATATE 200 MG/1
200 CAPSULE ORAL 3 TIMES DAILY PRN
Qty: 30 CAPSULE | Refills: 0 | Status: SHIPPED | OUTPATIENT
Start: 2024-01-16 | End: 2024-01-26

## 2024-01-16 NOTE — PROGRESS NOTES
Patient is seen my office today with chief complaint of persistent cough and feeling tired.  She has recently COVID-19 infection.  First time she checked on 10 January at home and her home COVID test came positive.  Patient went to the urgent care on the 11th and was told to take symptomatic medications.  No specific treatment was given.  Now she has no fever or chill.  Has some nasal drainage.  She also has some discomfort in the right ear..  She feels tired.  She has no fever or chills.  Has no shortness of breath or wheezing.  Review of other systems is negative.  Patient feels that she is better since the start of the symptoms.    On examination:  General examination: Normal  Eyes: There is no pallor or jaundice  Ears: Both external auditory canal tympanic membrane's are normal  Nose: Nasal mucosa is congested  Oral cavity throat: Normal exam  Lungs: Clear to auscultation  CVS: Heart sounds are regular there is no gallop murmur    Assessment and plan  1.  Recent COVID-19 infection.  Patient is outside the window to need any specific treatment  2.  Persistent cough: Tessalon Perle is being prescribed  Is 3.  Ear discomfort: Advised to take symptomatic medications as needed  4.  Patient wants to return back to work on Monday as she has exposure to the people she works for [employer].  And note will be given to the patient.  She is advised to follow-up with her regular primary care patient is symptoms persist.

## 2024-02-01 ENCOUNTER — APPOINTMENT (OUTPATIENT)
Dept: PRIMARY CARE | Facility: CLINIC | Age: 49
End: 2024-02-01
Payer: COMMERCIAL

## 2024-02-13 ENCOUNTER — HOSPITAL ENCOUNTER (OUTPATIENT)
Dept: RADIOLOGY | Facility: CLINIC | Age: 49
End: 2024-02-13
Payer: COMMERCIAL

## 2024-02-13 DIAGNOSIS — Z12.31 SCREENING MAMMOGRAM FOR BREAST CANCER: ICD-10-CM

## 2024-02-14 ENCOUNTER — APPOINTMENT (OUTPATIENT)
Dept: PRIMARY CARE | Facility: CLINIC | Age: 49
End: 2024-02-14
Payer: COMMERCIAL

## 2024-02-14 ENCOUNTER — APPOINTMENT (OUTPATIENT)
Dept: DERMATOLOGY | Facility: CLINIC | Age: 49
End: 2024-02-14
Payer: COMMERCIAL

## 2024-03-10 DIAGNOSIS — G43.909 MIGRAINE WITHOUT STATUS MIGRAINOSUS, NOT INTRACTABLE, UNSPECIFIED MIGRAINE TYPE: ICD-10-CM

## 2024-03-12 ENCOUNTER — HOSPITAL ENCOUNTER (OUTPATIENT)
Dept: RADIOLOGY | Facility: CLINIC | Age: 49
End: 2024-03-12
Payer: COMMERCIAL

## 2024-03-15 RX ORDER — ATOGEPANT 60 MG/1
60 TABLET ORAL DAILY
Qty: 90 TABLET | Refills: 0 | Status: SHIPPED | OUTPATIENT
Start: 2024-03-15

## 2024-04-17 DIAGNOSIS — F41.9 ANXIETY: ICD-10-CM

## 2024-04-17 RX ORDER — BUSPIRONE HYDROCHLORIDE 10 MG/1
10 TABLET ORAL 2 TIMES DAILY
Qty: 180 TABLET | Refills: 1 | Status: SHIPPED | OUTPATIENT
Start: 2024-04-17

## 2024-05-17 DIAGNOSIS — G47.33 SEVERE OBSTRUCTIVE SLEEP APNEA: ICD-10-CM

## 2024-05-17 DIAGNOSIS — E66.9 OBESITY, CLASS II, BMI 35-39.9: Primary | ICD-10-CM

## 2024-05-17 DIAGNOSIS — E88.810 METABOLIC SYNDROME: ICD-10-CM

## 2024-05-22 DIAGNOSIS — J30.9 ALLERGIC RHINITIS, UNSPECIFIED SEASONALITY, UNSPECIFIED TRIGGER: ICD-10-CM

## 2024-05-22 RX ORDER — FLUTICASONE PROPIONATE 50 MCG
SPRAY, SUSPENSION (ML) NASAL
Qty: 16 ML | Refills: 10 | Status: SHIPPED | OUTPATIENT
Start: 2024-05-22

## 2024-06-10 DIAGNOSIS — E53.8 VITAMIN B12 DEFICIENCY: ICD-10-CM

## 2024-06-10 DIAGNOSIS — E55.9 VITAMIN D INSUFFICIENCY: ICD-10-CM

## 2024-06-10 RX ORDER — CHOLECALCIFEROL (VITAMIN D3) 50 MCG
2000 TABLET ORAL DAILY
Qty: 30 TABLET | Refills: 2 | Status: SHIPPED | OUTPATIENT
Start: 2024-06-10 | End: 2024-09-08

## 2024-06-10 RX ORDER — UBIDECARENONE 75 MG
500 CAPSULE ORAL DAILY
Qty: 30 TABLET | Refills: 2 | Status: SHIPPED | OUTPATIENT
Start: 2024-06-10 | End: 2024-09-08

## 2024-06-12 DIAGNOSIS — E53.8 VITAMIN B12 DEFICIENCY: Primary | ICD-10-CM

## 2024-06-12 DIAGNOSIS — E55.9 VITAMIN D INSUFFICIENCY: ICD-10-CM

## 2024-06-21 RX ORDER — CHOLECALCIFEROL (VITAMIN D3) 125 MCG
125 CAPSULE ORAL DAILY
Qty: 90 CAPSULE | Refills: 1 | OUTPATIENT
Start: 2024-06-21

## 2024-06-21 RX ORDER — ZINC GLUCONATE 50 MG
1000 TABLET ORAL DAILY
Qty: 90 TABLET | Refills: 1 | OUTPATIENT
Start: 2024-06-21

## 2024-07-12 ENCOUNTER — APPOINTMENT (OUTPATIENT)
Dept: PRIMARY CARE | Facility: CLINIC | Age: 49
End: 2024-07-12
Payer: COMMERCIAL

## 2024-07-12 DIAGNOSIS — F41.9 ANXIETY: ICD-10-CM

## 2024-07-12 DIAGNOSIS — G43.909 MIGRAINE WITHOUT STATUS MIGRAINOSUS, NOT INTRACTABLE, UNSPECIFIED MIGRAINE TYPE: ICD-10-CM

## 2024-07-12 RX ORDER — BUSPIRONE HYDROCHLORIDE 10 MG/1
10 TABLET ORAL 2 TIMES DAILY
Qty: 60 TABLET | Refills: 0 | Status: SHIPPED | OUTPATIENT
Start: 2024-07-12 | End: 2024-08-11

## 2024-07-12 RX ORDER — ATOGEPANT 60 MG/1
60 TABLET ORAL DAILY
Qty: 30 TABLET | Refills: 0 | Status: SHIPPED | OUTPATIENT
Start: 2024-07-12 | End: 2024-08-11

## 2024-07-12 RX ORDER — PAROXETINE 10 MG/1
10 TABLET, FILM COATED ORAL DAILY
Qty: 30 TABLET | Refills: 0 | Status: SHIPPED | OUTPATIENT
Start: 2024-07-12 | End: 2024-08-11

## 2024-07-16 ENCOUNTER — APPOINTMENT (OUTPATIENT)
Dept: PRIMARY CARE | Facility: CLINIC | Age: 49
End: 2024-07-16
Payer: COMMERCIAL

## 2024-07-16 VITALS
HEART RATE: 79 BPM | BODY MASS INDEX: 36.32 KG/M2 | WEIGHT: 226 LBS | DIASTOLIC BLOOD PRESSURE: 91 MMHG | HEIGHT: 66 IN | TEMPERATURE: 98 F | SYSTOLIC BLOOD PRESSURE: 134 MMHG

## 2024-07-16 DIAGNOSIS — E53.8 VITAMIN B12 DEFICIENCY: ICD-10-CM

## 2024-07-16 DIAGNOSIS — G43.909 MIGRAINE WITHOUT STATUS MIGRAINOSUS, NOT INTRACTABLE, UNSPECIFIED MIGRAINE TYPE: ICD-10-CM

## 2024-07-16 DIAGNOSIS — E55.9 VITAMIN D INSUFFICIENCY: ICD-10-CM

## 2024-07-16 DIAGNOSIS — I10 PRIMARY HYPERTENSION: ICD-10-CM

## 2024-07-16 DIAGNOSIS — Z13.29 THYROID DISORDER SCREENING: ICD-10-CM

## 2024-07-16 DIAGNOSIS — E78.5 DYSLIPIDEMIA: ICD-10-CM

## 2024-07-16 DIAGNOSIS — G47.33 SEVERE OBSTRUCTIVE SLEEP APNEA: ICD-10-CM

## 2024-07-16 DIAGNOSIS — F41.9 ANXIETY: Primary | ICD-10-CM

## 2024-07-16 PROCEDURE — 1036F TOBACCO NON-USER: CPT | Performed by: INTERNAL MEDICINE

## 2024-07-16 PROCEDURE — 3075F SYST BP GE 130 - 139MM HG: CPT | Performed by: INTERNAL MEDICINE

## 2024-07-16 PROCEDURE — 3080F DIAST BP >= 90 MM HG: CPT | Performed by: INTERNAL MEDICINE

## 2024-07-16 PROCEDURE — 99214 OFFICE O/P EST MOD 30 MIN: CPT | Performed by: INTERNAL MEDICINE

## 2024-07-16 RX ORDER — ATOGEPANT 60 MG/1
60 TABLET ORAL DAILY
Qty: 90 TABLET | Refills: 1 | Status: SHIPPED | OUTPATIENT
Start: 2024-07-16 | End: 2025-01-12

## 2024-07-16 RX ORDER — ESCITALOPRAM OXALATE 10 MG/1
10 TABLET ORAL DAILY
Qty: 90 TABLET | Refills: 1 | Status: SHIPPED | OUTPATIENT
Start: 2024-07-16 | End: 2025-01-12

## 2024-07-16 ASSESSMENT — ENCOUNTER SYMPTOMS
SHORTNESS OF BREATH: 0
BLOOD IN STOOL: 0
LIGHT-HEADEDNESS: 0
CONFUSION: 0
FATIGUE: 1
COUGH: 0
HALLUCINATIONS: 0
ABDOMINAL PAIN: 0
PALPITATIONS: 0
VOMITING: 0
FEVER: 0
CHILLS: 0
HEADACHES: 1
NAUSEA: 0
SORE THROAT: 0
DIZZINESS: 0
DYSURIA: 0
NERVOUS/ANXIOUS: 1

## 2024-07-16 NOTE — PROGRESS NOTES
"Subjective   Patient ID: Tanisha Stewart is a 48 y.o. female who presents for Headache (Anxiety, tension, lack of motivation).    HPI  Patient is tired, anxiety, and feels overwhelmed all the time. Feels like she is racing. She took off work to try to feel better. She is not taking mesalamine because the ulcerative colitis hasn't been acting up. She is at work and everything is irritating her. She starts getting anxious.  Patient is on buspirone for anxiety.  She states she has not been on other medications for anxiety in the past was on paroxetine she was taking for menopausal symptoms.  She denies suicidal ideation.  No hallucinations.  Denies any family history of psychiatric illness.  No confusion.  No other new medications.  She is due for blood work.    Patient has daytime sleepiness, snoring, and morning headaches. She was diagnosed with possible sleep apnea in the past but never followed up for testing.     Review of Systems   Constitutional:  Positive for fatigue. Negative for chills and fever.   HENT:  Negative for sore throat.    Eyes:  Negative for visual disturbance.   Respiratory:  Negative for cough and shortness of breath.    Cardiovascular:  Negative for chest pain, palpitations and leg swelling.   Gastrointestinal:  Negative for abdominal pain, blood in stool, nausea and vomiting.   Genitourinary:  Negative for dysuria.   Skin:  Negative for rash.   Neurological:  Positive for headaches. Negative for dizziness, syncope and light-headedness.   Psychiatric/Behavioral:  Negative for confusion, hallucinations, self-injury and suicidal ideas. The patient is nervous/anxious.        Objective   BP (!) 134/91   Pulse 79   Temp 36.7 °C (98 °F) (Temporal)   Ht 1.676 m (5' 6\")   Wt 103 kg (226 lb)   BMI 36.48 kg/m²     Physical Exam  Vitals and nursing note reviewed.   Constitutional:       General: She is not in acute distress.     Appearance: She is obese. She is not ill-appearing, toxic-appearing " or diaphoretic.   HENT:      Head: Normocephalic and atraumatic.      Mouth/Throat:      Mouth: Mucous membranes are moist.      Pharynx: Oropharynx is clear. No oropharyngeal exudate.   Eyes:      Extraocular Movements: Extraocular movements intact.      Pupils: Pupils are equal, round, and reactive to light.   Cardiovascular:      Rate and Rhythm: Normal rate and regular rhythm.      Heart sounds: Normal heart sounds. No murmur heard.  Pulmonary:      Effort: Pulmonary effort is normal. No respiratory distress.      Breath sounds: Normal breath sounds. No wheezing, rhonchi or rales.   Abdominal:      General: Bowel sounds are normal. There is no distension.      Palpations: Abdomen is soft. There is no mass.      Tenderness: There is no abdominal tenderness.   Musculoskeletal:      Cervical back: Neck supple. No tenderness.      Right lower leg: No edema.      Left lower leg: No edema.   Lymphadenopathy:      Cervical: No cervical adenopathy.   Skin:     General: Skin is warm and dry.      Coloration: Skin is not jaundiced.      Findings: No rash.   Neurological:      General: No focal deficit present.      Mental Status: She is alert and oriented to person, place, and time.      Cranial Nerves: No cranial nerve deficit.   Psychiatric:         Mood and Affect: Mood normal.         Behavior: Behavior normal.         Thought Content: Thought content normal.         Judgment: Judgment normal.         Assessment/Plan   Problem List Items Addressed This Visit             ICD-10-CM    Anxiety - Primary F41.9    Relevant Medications    escitalopram (Lexapro) 10 mg tablet    Dyslipidemia E78.5    Relevant Orders    Lipid panel    HBP (high blood pressure) I10    Relevant Orders    CBC and Auto Differential    Comprehensive metabolic panel    Migraine G43.909    Relevant Medications    atogepant (Qulipta) 60 mg tablet tablet    Vitamin B12 deficiency E53.8    Relevant Orders    Vitamin B12    Vitamin D insufficiency E55.9     Relevant Orders    Vitamin D 25-Hydroxy,Total (for eval of Vitamin D levels)    Severe obstructive sleep apnea G47.33    Relevant Orders    Home sleep apnea test (HSAT)    Thyroid disorder screening Z13.29    Relevant Orders    TSH with reflex to Free T4 if abnormal     Anxiety: Chronic, uncontrolled.  We are going to start her on Lexapro 10 mg daily continue buspirone and hydroxyzine as needed.  Lab work is also been ordered.  Will see the patient back in 4 to 6 months for recheck.  No suicidal homicidal ideation.  Denies actual depression.    Dyslipidemia: Will check lipid panel    High blood pressure: Pressure is mildly elevated in the office today at this time we will monitor but we may need to consider either restarting her back on propranolol or a different medication such as losartan or lisinopril.  Given her history of migraines propranolol may be a better choice.    Migraines: Chronic, stable refills provided for Qulipta.    Vitamin B12 deficiency: Check a vitamin B12 level    Vitamin D deficiency: Check vitamin D level    Obstructive sleep apnea: She had a diagnosis listed in her medical record of severe sleep apnea but she states she is never actually had a formal sleep apnea testing.  We have ordered a home sleep study given her history of snoring daytime sleepiness.

## 2024-07-16 NOTE — LETTER
July 16, 2024     Patient: Tanisha Stewart   YOB: 1975   Date of Visit: 7/16/2024       To Whom It May Concern:    Tanisha Stewart was seen in my clinic on 7/16/2024 at 8:30 am. Please excuse Tanisha for her absence from work on this day 7/16/2024 through 7/21/2024 as she is in our care for a medical condition.     If you have any questions or concerns, please don't hesitate to call.         Sincerely,         Tres Walker,         CC: No Recipients

## 2024-07-17 ENCOUNTER — LAB (OUTPATIENT)
Dept: LAB | Facility: LAB | Age: 49
End: 2024-07-17
Payer: COMMERCIAL

## 2024-07-17 DIAGNOSIS — E78.5 DYSLIPIDEMIA: ICD-10-CM

## 2024-07-17 DIAGNOSIS — I10 PRIMARY HYPERTENSION: ICD-10-CM

## 2024-07-17 DIAGNOSIS — E55.9 VITAMIN D INSUFFICIENCY: ICD-10-CM

## 2024-07-17 DIAGNOSIS — E53.8 VITAMIN B12 DEFICIENCY: ICD-10-CM

## 2024-07-17 DIAGNOSIS — Z13.29 THYROID DISORDER SCREENING: ICD-10-CM

## 2024-07-17 LAB
25(OH)D3 SERPL-MCNC: 40 NG/ML (ref 30–100)
ALBUMIN SERPL BCP-MCNC: 4.3 G/DL (ref 3.4–5)
ALP SERPL-CCNC: 61 U/L (ref 33–110)
ALT SERPL W P-5'-P-CCNC: 7 U/L (ref 7–45)
ANION GAP SERPL CALC-SCNC: 11 MMOL/L (ref 10–20)
AST SERPL W P-5'-P-CCNC: 15 U/L (ref 9–39)
BASOPHILS # BLD AUTO: 0.07 X10*3/UL (ref 0–0.1)
BASOPHILS NFR BLD AUTO: 0.9 %
BILIRUB SERPL-MCNC: 1.3 MG/DL (ref 0–1.2)
BUN SERPL-MCNC: 13 MG/DL (ref 6–23)
CALCIUM SERPL-MCNC: 9.1 MG/DL (ref 8.6–10.3)
CHLORIDE SERPL-SCNC: 104 MMOL/L (ref 98–107)
CHOLEST SERPL-MCNC: 193 MG/DL (ref 0–199)
CHOLESTEROL/HDL RATIO: 3.6
CO2 SERPL-SCNC: 29 MMOL/L (ref 21–32)
CREAT SERPL-MCNC: 0.83 MG/DL (ref 0.5–1.05)
EGFRCR SERPLBLD CKD-EPI 2021: 87 ML/MIN/1.73M*2
EOSINOPHIL # BLD AUTO: 0.32 X10*3/UL (ref 0–0.7)
EOSINOPHIL NFR BLD AUTO: 4.3 %
ERYTHROCYTE [DISTWIDTH] IN BLOOD BY AUTOMATED COUNT: 12.8 % (ref 11.5–14.5)
GLUCOSE SERPL-MCNC: 92 MG/DL (ref 74–99)
HCT VFR BLD AUTO: 43.1 % (ref 36–46)
HDLC SERPL-MCNC: 52.9 MG/DL
HGB BLD-MCNC: 13.9 G/DL (ref 12–16)
IMM GRANULOCYTES # BLD AUTO: 0.03 X10*3/UL (ref 0–0.7)
IMM GRANULOCYTES NFR BLD AUTO: 0.4 % (ref 0–0.9)
LDLC SERPL CALC-MCNC: 117 MG/DL
LYMPHOCYTES # BLD AUTO: 2.54 X10*3/UL (ref 1.2–4.8)
LYMPHOCYTES NFR BLD AUTO: 34.2 %
MCH RBC QN AUTO: 28.5 PG (ref 26–34)
MCHC RBC AUTO-ENTMCNC: 32.3 G/DL (ref 32–36)
MCV RBC AUTO: 89 FL (ref 80–100)
MONOCYTES # BLD AUTO: 0.55 X10*3/UL (ref 0.1–1)
MONOCYTES NFR BLD AUTO: 7.4 %
NEUTROPHILS # BLD AUTO: 3.92 X10*3/UL (ref 1.2–7.7)
NEUTROPHILS NFR BLD AUTO: 52.8 %
NON HDL CHOLESTEROL: 140 MG/DL (ref 0–149)
NRBC BLD-RTO: 0 /100 WBCS (ref 0–0)
PLATELET # BLD AUTO: 315 X10*3/UL (ref 150–450)
POTASSIUM SERPL-SCNC: 4.2 MMOL/L (ref 3.5–5.3)
PROT SERPL-MCNC: 7.2 G/DL (ref 6.4–8.2)
RBC # BLD AUTO: 4.87 X10*6/UL (ref 4–5.2)
SODIUM SERPL-SCNC: 140 MMOL/L (ref 136–145)
TRIGL SERPL-MCNC: 118 MG/DL (ref 0–149)
TSH SERPL-ACNC: 1.78 MIU/L (ref 0.44–3.98)
VIT B12 SERPL-MCNC: 407 PG/ML (ref 211–911)
VLDL: 24 MG/DL (ref 0–40)
WBC # BLD AUTO: 7.4 X10*3/UL (ref 4.4–11.3)

## 2024-07-17 PROCEDURE — 36415 COLL VENOUS BLD VENIPUNCTURE: CPT

## 2024-07-17 PROCEDURE — 85025 COMPLETE CBC W/AUTO DIFF WBC: CPT

## 2024-07-17 PROCEDURE — 82607 VITAMIN B-12: CPT

## 2024-07-17 PROCEDURE — 80053 COMPREHEN METABOLIC PANEL: CPT

## 2024-07-17 PROCEDURE — 80061 LIPID PANEL: CPT

## 2024-07-17 PROCEDURE — 84443 ASSAY THYROID STIM HORMONE: CPT

## 2024-07-17 PROCEDURE — 82306 VITAMIN D 25 HYDROXY: CPT

## 2024-07-23 ENCOUNTER — APPOINTMENT (OUTPATIENT)
Dept: PODIATRY | Facility: CLINIC | Age: 49
End: 2024-07-23
Payer: COMMERCIAL

## 2024-07-25 ENCOUNTER — PROCEDURE VISIT (OUTPATIENT)
Dept: SLEEP MEDICINE | Facility: HOSPITAL | Age: 49
End: 2024-07-25
Payer: COMMERCIAL

## 2024-07-25 DIAGNOSIS — G47.33 SEVERE OBSTRUCTIVE SLEEP APNEA: ICD-10-CM

## 2024-07-25 PROCEDURE — 95806 SLEEP STUDY UNATT&RESP EFFT: CPT | Performed by: INTERNAL MEDICINE

## 2024-08-02 ENCOUNTER — APPOINTMENT (OUTPATIENT)
Dept: RADIOLOGY | Facility: CLINIC | Age: 49
End: 2024-08-02
Payer: COMMERCIAL

## 2024-08-06 ENCOUNTER — TELEPHONE (OUTPATIENT)
Dept: PRIMARY CARE | Facility: CLINIC | Age: 49
End: 2024-08-06
Payer: COMMERCIAL

## 2024-08-06 NOTE — TELEPHONE ENCOUNTER
----- Message from Tres Walker sent at 8/6/2024  2:24 PM EDT -----  Patient's labs were reviewed she had normal sugars normal electrolytes.  Potassium specifically was normal at a good level and kidney function and liver enzymes were also unremarkable.  Patient's cholesterol was mildly elevated in terms of bad choles  terol and vitamin D level and vitamin B12 levels were normal.  Thyroid levels were normal and patient's blood counts were also normal.

## 2024-08-06 NOTE — RESULT ENCOUNTER NOTE
Sleep study showed severe obstructive sleep apnea. Treatment is recommended with CPAP therapy. Would patient like to pursue treatment?

## 2024-08-06 NOTE — TELEPHONE ENCOUNTER
----- Message from Tres Walker sent at 8/6/2024  2:22 PM EDT -----  Sleep study showed severe obstructive sleep apnea. Treatment is recommended with CPAP therapy. Would patient like to pursue treatment?

## 2024-08-10 ENCOUNTER — HOSPITAL ENCOUNTER (OUTPATIENT)
Dept: RADIOLOGY | Facility: CLINIC | Age: 49
End: 2024-08-10
Payer: COMMERCIAL

## 2024-08-20 ENCOUNTER — APPOINTMENT (OUTPATIENT)
Dept: GASTROENTEROLOGY | Facility: HOSPITAL | Age: 49
End: 2024-08-20
Payer: COMMERCIAL

## 2024-09-17 ENCOUNTER — TELEPHONE (OUTPATIENT)
Dept: SLEEP MEDICINE | Facility: HOSPITAL | Age: 49
End: 2024-09-17
Payer: COMMERCIAL

## 2024-09-17 NOTE — TELEPHONE ENCOUNTER
Pt completed sleep test ordered by PCP, was instructed to call us for PAP therapy. Has current appt with Dr. Meneses in 12/2024.

## 2024-09-23 ENCOUNTER — APPOINTMENT (OUTPATIENT)
Facility: CLINIC | Age: 49
End: 2024-09-23
Payer: COMMERCIAL

## 2024-09-23 DIAGNOSIS — R09.81 SINUS CONGESTION: ICD-10-CM

## 2024-09-23 DIAGNOSIS — E66.9 OBESITY (BMI 30-39.9): ICD-10-CM

## 2024-09-23 DIAGNOSIS — G47.33 SEVERE OBSTRUCTIVE SLEEP APNEA: Primary | ICD-10-CM

## 2024-09-23 PROCEDURE — 99214 OFFICE O/P EST MOD 30 MIN: CPT | Performed by: GENERAL PRACTICE

## 2024-09-23 PROCEDURE — 1036F TOBACCO NON-USER: CPT | Performed by: GENERAL PRACTICE

## 2024-09-23 NOTE — PROGRESS NOTES
Patient: Tanisha Stewart    [id number]  : 1975 -- AGE 48 y.o.    Provider: Levon Meneses DO     Location [facility]   Service Date: 2024              [facility]  New Visit Note    Virtual or Telephone Consent  An interactive audio and video telecommunication system which permits real time communications between the patient (at the originating site) and provider (at the distant site) was utilized to provide this telehealth service.     Verbal consent was requested and obtained from Tanisha Stewart on this date, 24 for a telehealth visit.       HISTORY OF PRESENT ILLNESS     The patient's referring provider is: Tres Walker DO  She had seen Camille Miranda in the past but is new to me.     HISTORY OF PRESENT ILLNESS   Tanisha Stewart is a 48 y.o. female who presents to a [facility] for a sleep medicine evaluation with concerns of No chief complaint on file..     The patient  has no past medical history on file..    PAST SLEEP HISTORY    Pmhx includes HTN, colitis, migraines, anxiety, sinus congestion.     Patient had a sleep study done in  due to snoring and night time awakenings. She did not start any therapy at the time.   She is now in the process of getting ready for bariatric surgery and had another sleep study done in . She hopes to get bariatric surgery done in 2024.     CURRENT HISTORY    On today's visit, 2024, the patient reports that she would like to establish care for her sleep apnea.     Sleep schedule  on weekdays / work days:  Usual Bedtime: 7:30pm  Sleep latency: 20min  Wake time : 1:45 am  Total sleep time average/day: 5 hours/day  Awakenings: 1-2x per week, nocturia/ unknown reason, short.   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     Preferred sleeping position: SLEEP POSITION: sidelying    Sleep-related ROS:    Snoring:  y  Witnessed apneas:  y      Gasping/ choking: y      Am Dry mouth:  n           Nasal congestion:    y, flonase prn.      am headaches: sometimes    Sleep is described as unrefreshing about half the time.     Daytime sleepiness: n  Fatigue or decreased energy: n  Difficulty remembering things in daytime: n  Difficulty staying focused in daytime: : n  Irritable during the day: n    Drowsy driving: n  Hx of car accident: n  Near-miss Car accident: n      RLS screen:  RLSSCREEN: - Sensations: Patient does not have unusual sensations in their extremities that cause an urge to move them   Sometimes gets cramping in her legs, rubbing them improves symptoms.     Sleep-related behaviors: DENIES    ESS: 9    REVIEW OF SYSTEMS     REVIEW OF SYSTEMS  Review of Systems   All other systems reviewed and are negative.      ALLERGIES AND MEDICATIONS     ALLERGIES  Allergies   Allergen Reactions    Bee Pollen Unknown       MEDICATIONS  Current Outpatient Medications   Medication Sig Dispense Refill    atogepant (Qulipta) 60 mg tablet tablet Take 1 tablet (60 mg) by mouth once daily. 90 tablet 1    busPIRone (Buspar) 10 mg tablet Take 1 tablet (10 mg) by mouth 2 times a day. 60 tablet 0    escitalopram (Lexapro) 10 mg tablet Take 1 tablet (10 mg) by mouth once daily. 90 tablet 1    fluticasone (Flonase) 50 mcg/actuation nasal spray PLACE 2 SPRAYS INTO EACH NOSTRIL ONCE DAILY 16 mL 10    hydrOXYzine HCL (Atarax) 10 mg tablet Take 1 tablet (10 mg) by mouth twice a day. As needed      levonorgestrel (Mirena) 21 mcg/24 hours (8 yrs) 52 mg IUD Mirena (52 MG) 20 MCG/24HR IUD   Refills: 0        Start : 23-Mar-2021   Active      rizatriptan (Maxalt) 10 mg tablet TAKE 1 TABLET AT ONSET OF HEADACHE. MAY REPEAT EVERY 2 HOURS AS NEEDED. MAXIMUM 3 TABLETS IN 24 HOURS.       No current facility-administered medications for this visit.          PAST HISTORY     PAST MEDICAL HISTORY  She  has no past medical history on file.      PAST SURGICAL HISTORY:  Past Surgical History:   Procedure Laterality Date    OTHER SURGICAL HISTORY  05/07/2015    History Of Prior Surgery    OTHER SURGICAL HISTORY  01/10/2019    Bladder surgery    OTHER SURGICAL HISTORY  01/10/2019    Complete colonoscopy       FAMILY HISTORY  Family History   Problem Relation Name Age of Onset    Graves' disease Mother      Thyroid disease Mother      Thyroid disease Sister      Diabetes Other grandmother         mellitus    Breast cancer Other grandmother     Lung cancer Other granfather      DOES/DOES NOT EC: does not have a family history of sleep disorder.      SOCIAL HISTORY  She  reports that she has never smoked. She has never been exposed to tobacco smoke. She has never used smokeless tobacco. She reports current alcohol use. She reports that she does not currently use drugs.     Caffeine consumption: 1 cup coffee in am.   Alcohol consumption: not regularly   Smoking: n  Marijuana: n  Other drugs: n      PHYSICAL EXAM     VITAL SIGNS: There were no vitals taken for this visit.     PREVIOUS WEIGHTS:  Wt Readings from Last 3 Encounters:   07/16/24 103 kg (226 lb)   01/16/24 105 kg (231 lb)   01/10/24 99.8 kg (220 lb 0.3 oz)       Physical Exam  CONSTITUTIONAL: Patient appears well developed and well nourished.   GENERAL: This is a healthy appearing patient . The patient is alert and appropriately verbally conversant   FACE: The face was inspected and no cutaneous masses or lesions were visualized.   EYES: Extra-ocular muscle function was intact.   ORAL CAVITY: Examination of the oral cavity revealed no mass lesions nor infection.   EARS: Examination of the ears revealed that the auricles were normally formed with no lesions.   NECK: No skin lesions or inflammatory processes were detected on visual inspection.  CARDIOVASCULAR: Peripheral perfusion intact, no evidence of cyanosis,  "or clubbing.   RESPIRATORY: Normal inspiration and expiration and chest wall expansion, no use of accessory muscles to breathe, no stridor.  NEUROLOGICAL: Mentation is clear. Alert and oriented to time, place, and person. Answering questions appropriately.  PSYCHIATRIC: Normal affect and appropriate behavior. Mood is without obvious agitation or disturbance.       RESULTS/DATA     No results found for: \"IRON\", \"TRANSFERRIN\", \"IRONSAT\", \"TIBC\", \"FERRITIN\"            ASSESSMENT/PLAN     Ms. Stewart is a 48 y.o. female and  has no past medical history on file. She was referred to the [facility] for evaluation of sleep apnea.     Problem List Items Addressed This Visit       Severe obstructive sleep apnea       Problem List and Orders    Pmhx includes HTN, colitis, migraines, anxiety, sinus congestion.     1- SARAH  HST 11/27/22 --> severe SARAH, AHI 3% 60.1, AHI 4% 53; SpO2 josh 56.9.   HST 7/25/24 -->severe SARAH, AHI 3% 58.9, AHI 4% 49.6; SpO2 josh 70.2.     Reviewed and discussed the above sleep study results and management options in details. All questions answered, patient verbalizes understanding.     -start Autopap 5-15cwp    -do not drive or operate heavy machinery if drowsy.  -avoid sleeping on your back.   -avoid sedating substances/ medication, alcohol, illicit drugs and tobacco.    2- Obesity/ bariatric surgery candidate  counseled on eating a healthy diet and exercising as tolerated.  Hoping to get surgery done in November 2024.     3-sinus problems  Uses flonase.     Follow up in 1 -2 month or sooner as needed       "

## 2024-09-23 NOTE — PATIENT INSTRUCTIONS
[facility] Sleep Medicine   [facility]  [address] [address]  [phone]       NAME: Tanisha Stewart   DATE: 9/23/2024     Your Sleep Provider Today: Levon Meneses DO  Your Primary Care Physician: Tres Walker DO   Your Referring Provider: Tres Walker DO    DIAGNOSIS:   1. Severe obstructive sleep apnea  Referral to Adult Sleep Medicine      2. Sinus congestion        3. Obesity (BMI 30-39.9)            Thank you for coming to the Sleep Medicine Clinic today! Your sleep medicine provider today was: Levon Meneses DO Below is a summary of your treatment plan, other important information, and our contact numbers:      TREATMENT PLAN       Instructions - Common SARAH Recs: - For your sleep apnea, continue to use your PAP every night and use it whenever you are sleeping.   - Avoid alcohol or sedatives several hours prior to sleeping.   - Get additional supplies for your PAP (e.g., mask, hose, filters) every 3 months or as your insurance allows from your [employer] company. Replacement cushions for your PAP mask can be requested monthly if airseals are an issue.  - Remember to clean your mask, tubings, and water chamber regularly as instructed.  - Avoid driving or operating heavy machinery when drowsy. A person driving while sleepy is five (5) times more likely to have an accident. If you feel sleepy, pull over and take a short power nap (sleep for less than 30 minutes). Otherwise, ask somebody to drive you.          IMPORTANT INFORMATION     Call 911 for medical emergencies.  Our offices are generally open from Monday-Friday, 9 am - 5 pm.  If you need to get in touch with me, you may either call me and my team(number is below) or you can use [employer].  If a referral for a test, for CPAP, or for another specialist was made, and you have not heard about scheduling this within a week, please call scheduling at [phone] ([phone]).  If you are unable  to make your appointment for clinic or an overnight study, kindly call the office at least 48 hours in advance to cancel and reschedule.  If you are on CPAP, please bring your device's card or the device to each clinic appointment.   There are no supporting services by either the sleep doctors or their staff on weekends and Holidays, or after 5 PM on weekdays.   If you have been asked to come to a sleep study, make sure you bring toiletries, a comfy pillow, and any nighttime medications that you may regularly take. Also be sure to eat dinner before you arrive. We generally do not provide meals.      PRESCRIPTIONS     We require 7 days advanced notice for prescription refills. If we do not receive the request in this time, we cannot guarantee that your medication will be refilled in time.      IMPORTANT PHONE NUMBERS     Sleep Medicine Clinic Fax: [phone]  Appointments (for Pediatric Sleep Clinic): [phone] - option 1  Appointments (for Adult Sleep Clinic): [phone] - option 2  Appointments (For Sleep Studies): [phone] - option 3  Behavioral Sleep Medicine: [phone]  Sleep Surgery: [phone]  ENT (Otolaryngology): [phone]  Headache Clinic (Neurology): [phone]  Neurology: [phone]  Psychiatry: [phone]  Pulmonary Function Testing (PFT) Center: [phone]  Pulmonary Medicine: [phone]  [employer] (DME): [phone]  [employer] (DME): [phone]  [facility] ([facility]): [phone]      OUR ADULT SLEEP MEDICINE TEAM   Please do not hesitate to call the office or sleep nurse with any questions between appointments:    Adult Sleep Nurses (Sully Sweeney, RN and Angelina Neumann RN):  For clinical questions and refilling prescriptions: [phone]  Email sleep diaries and other documents at: [email or web address]    Adult Sleep Medicine Secretaries:  Sagrario Vogel (For Qasim/Suyapa/Yumi/Judith/Sudheer/Chuy):   P:  [phone]  F: [phone]  Frances Shetty (For [place]): P: [phone]  Fax: [phone]  Ana Hasurban (For Jurcevic/Blank): P: [phone]  F: [phone]  Aracely Mcginnisuli (For [place]): P: [phone]  F: [phone]  Malissa Lamb (For Barbara/Karely/Zakhary): P: [phone]  F: [phone]  Stacie Moore (For [place]): P: [phone]  F: [phone]     Adult Sleep Medicine Advanced Practice Providers:  Albaro Mora ([place], [place])  Ursula Cali ([facility])  Safia Reddy CNP ([place], [place], [place])  Siomara Eason CNP ([place], [place], [place])  Holley Gordillo ([place], [place], [place])  Elizabeth [place] CNP ([facility])        OUR SLEEP TESTING LOCATIONS     Our team will contact you to schedule your sleep study, however, you can contact us as follow:  Main Phone Line (scheduling only): [phone]), option 3  Adult and Pediatric Locations  [facility] (6 years and older): [facility] - 4th floor ([address] [place] [address]) After hours line: [phone]  [facility] (Main campus: All ages): [facility], 6th floor. After hours line: [phone]   [place] (5 years and older; younger considered on case-by-case basis): [address], [address]. Scheduling  After hours line: [phone]   [place] (6 years and older): [address]; [address]; [address]   [place] (6 years and older): [address]  After hours line: [phone]   [place] (13 years and older) in [place]: [address], 2nd floor  After hours line: [phone]   [place] (13 year and older): [address]  After hours line: [phone]     Adult Only Locations:   [place] (18 years and older): [address], 2nd floor   [place] (18 years and older): [address]; 4th floor  After hours line:  [phone]   [place] (18 years and older) at [place]: [address] [place] [address]  After hours line: [phone]          CONTACTING YOUR SLEEP MEDICINE PROVIDER     Send a message directly to your provider through \"My Chart\", which is the email service through your  Records Account: [email or web address]   Call [phone] and leave a message. One of the administrative assistants will forward the message to your sleep medicine provider through \"My Chart\" and/or email.     Your sleep medicine provider for this visit was: Levon Meneses DO        "

## 2024-09-24 ENCOUNTER — APPOINTMENT (OUTPATIENT)
Dept: DERMATOLOGY | Facility: CLINIC | Age: 49
End: 2024-09-24
Payer: COMMERCIAL

## 2024-10-01 ENCOUNTER — APPOINTMENT (OUTPATIENT)
Dept: PODIATRY | Facility: CLINIC | Age: 49
End: 2024-10-01
Payer: COMMERCIAL

## 2024-10-06 DIAGNOSIS — F41.9 ANXIETY: ICD-10-CM

## 2024-10-07 RX ORDER — BUSPIRONE HYDROCHLORIDE 10 MG/1
10 TABLET ORAL 2 TIMES DAILY
Qty: 180 TABLET | Refills: 1 | Status: SHIPPED | OUTPATIENT
Start: 2024-10-07

## 2024-10-22 ENCOUNTER — ANESTHESIA EVENT (OUTPATIENT)
Dept: GASTROENTEROLOGY | Facility: EXTERNAL LOCATION | Age: 49
End: 2024-10-22

## 2024-10-22 ENCOUNTER — APPOINTMENT (OUTPATIENT)
Dept: GASTROENTEROLOGY | Facility: HOSPITAL | Age: 49
End: 2024-10-22
Payer: COMMERCIAL

## 2024-10-22 ENCOUNTER — APPOINTMENT (OUTPATIENT)
Dept: GASTROENTEROLOGY | Facility: EXTERNAL LOCATION | Age: 49
End: 2024-10-22
Payer: COMMERCIAL

## 2024-10-22 ENCOUNTER — ANESTHESIA (OUTPATIENT)
Dept: GASTROENTEROLOGY | Facility: EXTERNAL LOCATION | Age: 49
End: 2024-10-22

## 2024-10-22 VITALS
WEIGHT: 222 LBS | DIASTOLIC BLOOD PRESSURE: 81 MMHG | TEMPERATURE: 97 F | HEART RATE: 54 BPM | BODY MASS INDEX: 35.68 KG/M2 | RESPIRATION RATE: 18 BRPM | OXYGEN SATURATION: 99 % | SYSTOLIC BLOOD PRESSURE: 122 MMHG | HEIGHT: 66 IN

## 2024-10-22 DIAGNOSIS — E66.01 MORBID (SEVERE) OBESITY DUE TO EXCESS CALORIES (MULTI): ICD-10-CM

## 2024-10-22 DIAGNOSIS — E56.9 VITAMIN DEFICIENCY, UNSPECIFIED: Primary | ICD-10-CM

## 2024-10-22 DIAGNOSIS — Z98.84 BARIATRIC SURGERY STATUS: ICD-10-CM

## 2024-10-22 PROCEDURE — 87900 PHENOTYPE INFECT AGENT DRUG: CPT | Performed by: STUDENT IN AN ORGANIZED HEALTH CARE EDUCATION/TRAINING PROGRAM

## 2024-10-22 PROCEDURE — 43239 EGD BIOPSY SINGLE/MULTIPLE: CPT | Performed by: STUDENT IN AN ORGANIZED HEALTH CARE EDUCATION/TRAINING PROGRAM

## 2024-10-22 RX ORDER — PROPOFOL 10 MG/ML
INJECTION, EMULSION INTRAVENOUS AS NEEDED
Status: DISCONTINUED | OUTPATIENT
Start: 2024-10-22 | End: 2024-10-22

## 2024-10-22 RX ORDER — LIDOCAINE HYDROCHLORIDE 20 MG/ML
INJECTION, SOLUTION INFILTRATION; PERINEURAL AS NEEDED
Status: DISCONTINUED | OUTPATIENT
Start: 2024-10-22 | End: 2024-10-22

## 2024-10-22 SDOH — HEALTH STABILITY: MENTAL HEALTH: CURRENT SMOKER: 0

## 2024-10-22 ASSESSMENT — PAIN SCALES - GENERAL
PAINLEVEL_OUTOF10: 0 - NO PAIN
PAIN_LEVEL: 0

## 2024-10-22 ASSESSMENT — PAIN - FUNCTIONAL ASSESSMENT
PAIN_FUNCTIONAL_ASSESSMENT: 0-10
PAIN_FUNCTIONAL_ASSESSMENT: 0-10

## 2024-10-22 ASSESSMENT — COLUMBIA-SUICIDE SEVERITY RATING SCALE - C-SSRS
1. IN THE PAST MONTH, HAVE YOU WISHED YOU WERE DEAD OR WISHED YOU COULD GO TO SLEEP AND NOT WAKE UP?: NO
6. HAVE YOU EVER DONE ANYTHING, STARTED TO DO ANYTHING, OR PREPARED TO DO ANYTHING TO END YOUR LIFE?: NO
2. HAVE YOU ACTUALLY HAD ANY THOUGHTS OF KILLING YOURSELF?: NO

## 2024-10-22 NOTE — DISCHARGE INSTRUCTIONS

## 2024-10-22 NOTE — H&P
Outpatient NR Procedure H&P    Patient Profile  Name Tanisha Stewart  Date of Birth 1975  MRN [id number]  PCP Jean Van        Diagnosis: Pre-bariatric EGD, obesity.  Procedure(s):  EGD.    Allergies  Allergies   Allergen Reactions    Bee Pollen Unknown       Past Medical History   Past Medical History:   Diagnosis Date    Anxiety     Hypertension     Migraines     Sleep apnea        Medication Reviewed - yes  Prior to Admission medications    Medication Sig Start Date End Date Taking? Authorizing Provider   atogepant (Qulipta) 60 mg tablet tablet Take 1 tablet (60 mg) by mouth once daily. 7/16/24 1/12/25 Yes Tres Walker DO   busPIRone (Buspar) 10 mg tablet TAKE 1 TABLET BY MOUTH TWICE A DAY 10/7/24  Yes Tres Walker DO   escitalopram (Lexapro) 10 mg tablet Take 1 tablet (10 mg) by mouth once daily. 7/16/24 1/12/25 Yes Tres Walker DO   levonorgestrel (Mirena) 21 mcg/24 hours (8 yrs) 52 mg IUD Mirena (52 MG) 20 MCG/24HR IUD   Refills: 0        Start : 23-Mar-2021   Active 3/23/21  Yes Historical Provider, MD   fluticasone (Flonase) 50 mcg/actuation nasal spray PLACE 2 SPRAYS INTO EACH NOSTRIL ONCE DAILY 5/22/24   Tres Walker DO   hydrOXYzine HCL (Atarax) 10 mg tablet Take 1 tablet (10 mg) by mouth twice a day. As needed 3/25/22   Historical Provider, MD   rizatriptan (Maxalt) 10 mg tablet TAKE 1 TABLET AT ONSET OF HEADACHE. MAY REPEAT EVERY 2 HOURS AS NEEDED. MAXIMUM 3 TABLETS IN 24 HOURS. 5/5/21   Historical Provider, MD       Physical Exam  Vitals:    10/22/24 0926   BP: 119/80   Pulse: 63   Resp: 10   Temp: 36.7 °C (98.1 °F)   SpO2: 94%      Weight   Vitals:    10/22/24 0926   Weight: 101 kg (222 lb)     BMI Body mass index is 35.83 kg/m².    General: A&Ox3, NAD.  HEENT: AT/NC.   CV: RRR. No murmur.  Resp: CTA bilaterally. No wheezing, rhonchi or rales.   GI: Soft, NT/ND.   Extrem: No edema. Pulses intact.  Skin: No Jaundice.   Neuro: No focal deficits.   Psych: Normal  mood and affect.        Oropharyngeal Classification I (soft palate, uvula, fauces, and tonsillar pillars visible)  ASA PS Classification 3  Sedation Plan: Deep Sedation.  Procedure Plan - pre-procedural (re)assesment completed by physician:  discharge/transfer patient when discharge criteria met    Norberto King DO  10/22/2024 9:44 AM

## 2024-10-22 NOTE — ANESTHESIA PREPROCEDURE EVALUATION
Patient: Tanisha Stewart    Procedure Information       Date/Time: 10/22/24 0940    Scheduled providers: Norberto King DO    Procedure: EGD    Location: [facility]            Relevant Problems   Anesthesia (within normal limits)      Cardiac   (+) HBP (high blood pressure)   (+) Hypertriglyceridemia      Pulmonary   (+) Severe obstructive sleep apnea      Neuro   (+) Anxiety   (+) Bilateral carpal tunnel syndrome   (+) Carpal tunnel syndrome of right wrist      GI   (+) Ulcerative proctitis with rectal bleeding (Multi)      /Renal   (+) UTI (urinary tract infection)      Liver (within normal limits)      Endocrine   (+) Class 2 obesity with alveolar hypoventilation and body mass index (BMI) of 38.0 to 38.9 in adult      Hematology (within normal limits)      Musculoskeletal   (+) Bilateral carpal tunnel syndrome   (+) Carpal tunnel syndrome of right wrist   (+) Osteoarthritis cervical spine      HEENT   (+) Sinus congestion   (+) Sinusitis, acute      ID   (+) COVID-19 virus infection   (+) Influenza A   (+) URI (upper respiratory infection)   (+) UTI (urinary tract infection)   3 years postmenopause    Clinical information reviewed:                   NPO Detail:  No data recorded     Physical Exam    Airway  Mallampati: I  TM distance: >3 FB  Neck ROM: full     Cardiovascular   Rhythm: regular     Dental    Pulmonary   Breath sounds clear to auscultation     Abdominal            Anesthesia Plan    History of general anesthesia?: yes  History of complications of general anesthesia?: no    ASA 3     MAC     The patient is not a current smoker.    intravenous induction   Anesthetic plan and risks discussed with patient.

## 2024-10-22 NOTE — ANESTHESIA POSTPROCEDURE EVALUATION
Patient: Tanisha Stewart    Procedure Summary       Date: 10/22/24 Room / Location: [facility]    Anesthesia Start: 0942 Anesthesia Stop: 0958    Procedure: EGD Diagnosis:       Vitamin deficiency, unspecified      Morbid (severe) obesity due to excess calories (Multi)      Bariatric surgery status      Vitamin deficiency, unspecified    Scheduled Providers: Norberto King DO Responsible Provider: ASHLEY Zavala-CRNA    Anesthesia Type: MAC ASA Status: 3            Anesthesia Type: MAC    Vitals Value Taken Time   /81 10/22/24 1020   Temp 36.1 °C (97 °F) 10/22/24 0958   Pulse 54 10/22/24 1020   Resp 18 10/22/24 1020   SpO2 99 % 10/22/24 1020       Anesthesia Post Evaluation    Patient location during evaluation: bedside  Patient participation: complete - patient participated  Level of consciousness: awake and alert  Pain score: 0  Pain management: adequate  Airway patency: patent  Cardiovascular status: acceptable  Respiratory status: acceptable  Hydration status: acceptable  Postoperative Nausea and Vomiting: none        No notable events documented.

## 2024-10-30 LAB
LAB AP ASR DISCLAIMER: NORMAL
LABORATORY COMMENT REPORT: NORMAL
PATH REPORT.FINAL DX SPEC: NORMAL
PATH REPORT.GROSS SPEC: NORMAL
PATH REPORT.TOTAL CANCER: NORMAL

## 2024-10-31 ENCOUNTER — OFFICE VISIT (OUTPATIENT)
Dept: GASTROENTEROLOGY | Facility: CLINIC | Age: 49
End: 2024-10-31
Payer: COMMERCIAL

## 2024-10-31 DIAGNOSIS — A04.8 H. PYLORI INFECTION: Primary | ICD-10-CM

## 2024-10-31 PROCEDURE — 99214 OFFICE O/P EST MOD 30 MIN: CPT | Performed by: STUDENT IN AN ORGANIZED HEALTH CARE EDUCATION/TRAINING PROGRAM

## 2024-10-31 RX ORDER — TETRACYCLINE HYDROCHLORIDE 500 MG/1
500 CAPSULE ORAL 4 TIMES DAILY
Qty: 56 CAPSULE | Refills: 0 | Status: SHIPPED | OUTPATIENT
Start: 2024-10-31 | End: 2024-11-14

## 2024-10-31 RX ORDER — METRONIDAZOLE 500 MG/1
500 TABLET ORAL 4 TIMES DAILY
Qty: 56 TABLET | Refills: 0 | Status: SHIPPED | OUTPATIENT
Start: 2024-10-31 | End: 2024-11-14

## 2024-10-31 RX ORDER — BISMUTH SUBSALICYLATE 262 MG/1
524 TABLET ORAL
Qty: 112 TABLET | Refills: 0 | Status: SHIPPED | OUTPATIENT
Start: 2024-10-31 | End: 2024-11-14

## 2024-10-31 RX ORDER — OMEPRAZOLE 40 MG/1
40 CAPSULE, DELAYED RELEASE ORAL 2 TIMES DAILY
Qty: 28 CAPSULE | Refills: 0 | Status: SHIPPED | OUTPATIENT
Start: 2024-10-31 | End: 2024-11-14

## 2024-11-06 LAB
ELECTRONICALLY SIGNED BY: NORMAL
H. PYLORI DRUG SUSCEPTIBILITY RESULTS: NORMAL

## 2024-12-16 ENCOUNTER — APPOINTMENT (OUTPATIENT)
Dept: SLEEP MEDICINE | Facility: CLINIC | Age: 49
End: 2024-12-16
Payer: COMMERCIAL

## 2025-01-04 ENCOUNTER — HOSPITAL ENCOUNTER (OUTPATIENT)
Dept: RADIOLOGY | Facility: CLINIC | Age: 50
Discharge: HOME | End: 2025-01-04
Payer: COMMERCIAL

## 2025-01-04 VITALS — BODY MASS INDEX: 35.79 KG/M2 | WEIGHT: 222.66 LBS | HEIGHT: 66 IN

## 2025-01-04 DIAGNOSIS — Z12.31 ENCOUNTER FOR SCREENING MAMMOGRAM FOR MALIGNANT NEOPLASM OF BREAST: ICD-10-CM

## 2025-01-04 PROCEDURE — 77063 BREAST TOMOSYNTHESIS BI: CPT | Performed by: STUDENT IN AN ORGANIZED HEALTH CARE EDUCATION/TRAINING PROGRAM

## 2025-01-04 PROCEDURE — 77067 SCR MAMMO BI INCL CAD: CPT

## 2025-01-04 PROCEDURE — 77067 SCR MAMMO BI INCL CAD: CPT | Performed by: STUDENT IN AN ORGANIZED HEALTH CARE EDUCATION/TRAINING PROGRAM

## 2025-01-10 DIAGNOSIS — F41.9 ANXIETY: ICD-10-CM

## 2025-01-13 RX ORDER — ESCITALOPRAM OXALATE 10 MG/1
10 TABLET ORAL DAILY
Qty: 90 TABLET | Refills: 1 | Status: SHIPPED | OUTPATIENT
Start: 2025-01-13

## 2025-01-17 ENCOUNTER — APPOINTMENT (OUTPATIENT)
Dept: PRIMARY CARE | Facility: CLINIC | Age: 50
End: 2025-01-17
Payer: COMMERCIAL

## 2025-01-21 ENCOUNTER — APPOINTMENT (OUTPATIENT)
Dept: DERMATOLOGY | Facility: CLINIC | Age: 50
End: 2025-01-21
Payer: COMMERCIAL

## 2025-01-23 ENCOUNTER — TELEMEDICINE (OUTPATIENT)
Dept: PRIMARY CARE | Facility: CLINIC | Age: 50
End: 2025-01-23
Payer: COMMERCIAL

## 2025-01-23 DIAGNOSIS — R50.81 FEVER IN OTHER DISEASES: ICD-10-CM

## 2025-01-23 DIAGNOSIS — M79.10 MYALGIA: ICD-10-CM

## 2025-01-23 DIAGNOSIS — U07.1 COVID-19 VIRUS INFECTION: Primary | ICD-10-CM

## 2025-01-23 RX ORDER — NIRMATRELVIR AND RITONAVIR 300-100 MG
3 KIT ORAL 2 TIMES DAILY
Qty: 30 TABLET | Refills: 0 | Status: SHIPPED | OUTPATIENT
Start: 2025-01-23 | End: 2025-01-28

## 2025-01-23 NOTE — PROGRESS NOTES
Internal Medicine Outpatient Visit  Chief Complaint   Patient presents with    Sick Visit     Tested positive for covid today, symptoms started 2 days ago, with head and nasal congestion, body aches and chills.        HPI: Tanisha Stewart is of 49 y.o. who is here for Internal Visit for the following issues:  Patient has telephone appointment with chief complaint of cough congestion body aches and low-grade temperature for last 2 or 3 days.  She did do a home test for COVID-19 today which was positive.  She denies any headache or visual disturbances.  She has some nasal congestion.  Has low-grade fever.  Denies any shortness of breath or wheezing.  She has no chest pain or palpitation.  Has no weakness of any extremity.  Review of other systems are negative.  She did not have updated COVID-19 vaccine.  She only had initial 2 vaccinations for COVID-19.    Past Surgical History:   Procedure Laterality Date    CARPAL TUNNEL RELEASE      OTHER SURGICAL HISTORY  05/07/2015    History Of Prior Surgery    OTHER SURGICAL HISTORY  01/10/2019    Bladder surgery    OTHER SURGICAL HISTORY  01/10/2019    Complete colonoscopy       Family History   Problem Relation Name Age of Onset    Graves' disease Mother      Thyroid disease Mother      Thyroid disease Sister      Diabetes Other grandmother         mellitus    Breast cancer Other grandmother     Lung cancer Other granfather          Current Outpatient Medications on File Prior to Visit   Medication Sig Dispense Refill    escitalopram (Lexapro) 10 mg tablet TAKE 1 TABLET BY MOUTH EVERY DAY 90 tablet 1    hydrOXYzine HCL (Atarax) 10 mg tablet Take 1 tablet (10 mg) by mouth twice a day. As needed      levonorgestrel (Mirena) 21 mcg/24 hours (8 yrs) 52 mg IUD Mirena (52 MG) 20 MCG/24HR IUD   Refills: 0        Start : 23-Mar-2021   Active      PARoxetine (Paxil) 10 mg tablet TAKE 1 TABLET BY MOUTH EVERY DAY 90 tablet 1    busPIRone (Buspar) 10 mg tablet TAKE 1 TABLET BY MOUTH  TWICE A DAY (Patient not taking: Reported on 1/23/2025) 180 tablet 1    fluticasone (Flonase) 50 mcg/actuation nasal spray PLACE 2 SPRAYS INTO EACH NOSTRIL ONCE DAILY 16 mL 10    omeprazole (PriLOSEC) 40 mg DR capsule Take 1 capsule (40 mg) by mouth 2 times a day for 14 days. 28 capsule 0    [DISCONTINUED] rizatriptan (Maxalt) 10 mg tablet TAKE 1 TABLET AT ONSET OF HEADACHE. MAY REPEAT EVERY 2 HOURS AS NEEDED. MAXIMUM 3 TABLETS IN 24 HOURS. (Patient not taking: Reported on 1/23/2025)       No current facility-administered medications on file prior to visit.       There were no vitals taken for this visit.  There is no height or weight on file to calculate BMI.  General examination: Patient has moderate discomfort due to respiratory symptoms  Vital signs: Temperature was 99.5, she does not have a oxygen saturation machine at home  HEENT: She has nasal drainage and some congestion  Respiratory system has some cough without significant expectoration  Musculoskeletal system she has muscle aches  No other clinical information could be obtained.    Assessment and plan:    1. COVID-19 virus infection (Primary)  Patient has moderate symptoms she would prefer to to get some medication.  Paxlovid is being prescribed.  Patient is educated about symptoms of the severe COVID-19 infection advised in case of high fever lethargy shortness of breath or drop in oxygen saturation she should go to the ER for further evaluation.  Isolation precautions discussed with the patient.    2. Myalgia  Symptoms are not severe enough to need any separate medication.    3. Fever in other diseases  Advised to take acetaminophen as needed.

## 2025-01-27 ENCOUNTER — APPOINTMENT (OUTPATIENT)
Dept: PRIMARY CARE | Facility: CLINIC | Age: 50
End: 2025-01-27
Payer: COMMERCIAL

## 2025-02-07 ENCOUNTER — APPOINTMENT (OUTPATIENT)
Dept: PRIMARY CARE | Facility: CLINIC | Age: 50
End: 2025-02-07
Payer: COMMERCIAL

## 2025-02-18 ENCOUNTER — APPOINTMENT (OUTPATIENT)
Dept: PODIATRY | Facility: CLINIC | Age: 50
End: 2025-02-18
Payer: COMMERCIAL

## 2025-03-04 ENCOUNTER — APPOINTMENT (OUTPATIENT)
Dept: PRIMARY CARE | Facility: CLINIC | Age: 50
End: 2025-03-04
Payer: COMMERCIAL

## 2025-03-13 ENCOUNTER — APPOINTMENT (OUTPATIENT)
Facility: CLINIC | Age: 50
End: 2025-03-13
Payer: COMMERCIAL

## 2025-03-25 ENCOUNTER — APPOINTMENT (OUTPATIENT)
Dept: DERMATOLOGY | Facility: CLINIC | Age: 50
End: 2025-03-25
Payer: COMMERCIAL

## 2025-03-25 ENCOUNTER — OFFICE VISIT (OUTPATIENT)
Dept: DERMATOLOGY | Facility: CLINIC | Age: 50
End: 2025-03-25
Payer: COMMERCIAL

## 2025-03-25 DIAGNOSIS — L82.1 SEBORRHEIC KERATOSIS: ICD-10-CM

## 2025-03-25 DIAGNOSIS — D22.5 MELANOCYTIC NEVI OF TRUNK: ICD-10-CM

## 2025-03-25 DIAGNOSIS — D22.71 MELANOCYTIC NEVI OF RIGHT LOWER LIMB, INCLUDING HIP: ICD-10-CM

## 2025-03-25 DIAGNOSIS — D22.72 MELANOCYTIC NEVUS OF LEFT LOWER EXTREMITY: ICD-10-CM

## 2025-03-25 DIAGNOSIS — L70.0 OPEN COMEDONE: ICD-10-CM

## 2025-03-25 DIAGNOSIS — L81.4 LENTIGO: ICD-10-CM

## 2025-03-25 DIAGNOSIS — L91.8 SKIN TAG: ICD-10-CM

## 2025-03-25 DIAGNOSIS — D18.01 HEMANGIOMA OF SKIN: ICD-10-CM

## 2025-03-25 DIAGNOSIS — D22.60 MELANOCYTIC NEVI OF UNSPECIFIED UPPER LIMB, INCLUDING SHOULDER: ICD-10-CM

## 2025-03-25 DIAGNOSIS — L57.8 PHOTOAGING OF SKIN: Primary | ICD-10-CM

## 2025-03-25 DIAGNOSIS — Z12.83 ENCOUNTER FOR SCREENING FOR MALIGNANT NEOPLASM OF SKIN: ICD-10-CM

## 2025-03-25 PROCEDURE — 1036F TOBACCO NON-USER: CPT | Performed by: DERMATOLOGY

## 2025-03-25 PROCEDURE — 99203 OFFICE O/P NEW LOW 30 MIN: CPT | Performed by: DERMATOLOGY

## 2025-03-25 ASSESSMENT — DERMATOLOGY QUALITY OF LIFE (QOL) ASSESSMENT
RATE HOW EMOTIONALLY BOTHERED YOU ARE BY YOUR SKIN PROBLEM (FOR EXAMPLE, WORRY, EMBARRASSMENT, FRUSTRATION): 5
WHAT SINGLE SKIN CONDITION LISTED BELOW IS THE PATIENT ANSWERING THE QUALITY-OF-LIFE ASSESSMENT QUESTIONS ABOUT: NONE OF THE ABOVE
RATE HOW BOTHERED YOU ARE BY SYMPTOMS OF YOUR SKIN PROBLEM (EG, ITCHING, STINGING BURNING, HURTING OR SKIN IRRITATION): 2
RATE HOW EMOTIONALLY BOTHERED YOU ARE BY YOUR SKIN PROBLEM (FOR EXAMPLE, WORRY, EMBARRASSMENT, FRUSTRATION): 5
WHAT SINGLE SKIN CONDITION LISTED BELOW IS THE PATIENT ANSWERING THE QUALITY-OF-LIFE ASSESSMENT QUESTIONS ABOUT: NONE OF THE ABOVE
RATE HOW BOTHERED YOU ARE BY EFFECTS OF YOUR SKIN PROBLEMS ON YOUR ACTIVITIES (EG, GOING OUT, ACCOMPLISHING WHAT YOU WANT, WORK ACTIVITIES OR YOUR RELATIONSHIPS WITH OTHERS): 3
RATE HOW BOTHERED YOU ARE BY SYMPTOMS OF YOUR SKIN PROBLEM (EG, ITCHING, STINGING BURNING, HURTING OR SKIN IRRITATION): 2
RATE HOW BOTHERED YOU ARE BY EFFECTS OF YOUR SKIN PROBLEMS ON YOUR ACTIVITIES (EG, GOING OUT, ACCOMPLISHING WHAT YOU WANT, WORK ACTIVITIES OR YOUR RELATIONSHIPS WITH OTHERS): 3
DATE THE QUALITY-OF-LIFE ASSESSMENT WAS COMPLETED: 67289

## 2025-03-25 ASSESSMENT — ITCH NUMERIC RATING SCALE: HOW SEVERE IS YOUR ITCHING?: 0

## 2025-03-25 NOTE — PROGRESS NOTES
Subjective     Tanisha Stewart is a 49 y.o. female who presents for the following: Skin Check (Pt here for yearly FBSE. No hx of skin cancer. Pt has lesion on forehead that is enlarging.).     Review of Systems:  No other skin or systemic complaints other than what is documented elsewhere in the note.    The following portions of the chart were reviewed this encounter and updated as appropriate:         Skin Cancer History  No skin cancer on file.      Specialty Problems          Dermatology Problems    Hemangioma of skin and subcutaneous tissue    Melanocytic nevi of trunk    Other seborrheic keratosis        Objective   Well appearing patient in no apparent distress; mood and affect are within normal limits.    A full examination was performed including scalp, head, eyes, ears, nose, lips, neck, chest, axillae, abdomen, back, buttocks, bilateral upper extremities, bilateral lower extremities, hands, feet, fingers, toes, fingernails, and toenails. Declined examination of genitals, does see provider for routine pelvic examinations.  All findings within normal limits unless otherwise noted below.    Assessment/Plan   1. Photoaging of skin  Mottled pigmentation with telangiectasias and brown reticular macules in sun exposed areas of the body.    The risk of chronic, cumulative sun damage and risk of development of skin cancer was reviewed today.   The importance of sun protection was reviewed: including the use of a broad spectrum sunscreen of at least SPF 30 that protects against both UVA/UVB rays, with ingredients such as Zinc oxide or titanium dioxide, wearing sun protective clothing and sun avoidance. We reviewed the warning signs of non-melanoma skin cancer and ABCDEs of melanoma  Please follow up should you notice any new or changing pre-existing skin lesion.    2. Seborrheic keratosis (2)  Generalized, Left Forehead  Brown, tan waxy macules and stuck on appearing papules and plaques    The benign nature of  these skin lesions reviewed, reassure provided and no further treatment needed at this time.   These lesions can be removed, if symptomatic (itching, bleeding, rubbing on clothing, painful), otherwise removal is considered cosmetic.     3. Open comedone  Left Upper Eyelid  Open pore with comedonal debris    The benign nature of these skin lesions were reviewed, no treatment is necessary.   Please follow up for any new or pre-existing lesion that is changing in size, shape, color, becomes painful, tender, itches or bleed.    Extracted as courtesy with nicholas    4. Hemangioma of skin  Cherry red papules    The benign nature of these skin lesions were reviewed, no treatment is necessary.   Please follow up for any new or pre-existing lesion that is changing in size, shape, color, becomes painful, tender, itches or bleed.    5. Melanocytic nevi of trunk (2)  Abdomen (Lower Torso, Anterior), Torso - Posterior (Back)  Tan-brown symmetric macules and papules    Clinically benign appearing nevi, no treatment is necessary.  The importance of sun protection was reviewed: including the use of a broad spectrum sunscreen that protects against both UVA/UVB rays, with ingredients such as Zinc oxide or titanium dioxide, wearing sun protective clothing and sun avoidance.   ABCDEs of melanoma reviewed.  Please follow up should you notice any new or changing pre-existing skin lesion.    6. Melanocytic nevi of unspecified upper limb, including shoulder (2)  Left Arm, Right Arm  Scattered, uniform and benign-appearing, regular brown melanocytic papules and macules.    Clinically benign appearing nevi, no treatment is necessary.  The importance of sun protection was reviewed: including the use of a broad spectrum sunscreen that protects against both UVA/UVB rays, with ingredients such as Zinc oxide or titanium dioxide, wearing sun protective clothing and sun avoidance.   ABCDEs of melanoma reviewed.  Please follow up should you notice  any new or changing pre-existing skin lesion.    7. Melanocytic nevus of left lower extremity  Left Leg  Scattered, uniform and benign-appearing, regular brown melanocytic papules and macules.    Clinically benign appearing nevi, no treatment is necessary.  The importance of sun protection was reviewed: including the use of a broad spectrum sunscreen that protects against both UVA/UVB rays, with ingredients such as Zinc oxide or titanium dioxide, wearing sun protective clothing and sun avoidance.   ABCDEs of melanoma reviewed.  Please follow up should you notice any new or changing pre-existing skin lesion.    8. Melanocytic nevi of right lower limb, including hip  Right Leg  Scattered, uniform and benign-appearing, regular brown melanocytic papules and macules.    Clinically benign appearing nevi, no treatment is necessary.  The importance of sun protection was reviewed: including the use of a broad spectrum sunscreen that protects against both UVA/UVB rays, with ingredients such as Zinc oxide or titanium dioxide, wearing sun protective clothing and sun avoidance.   ABCDEs of melanoma reviewed.  Please follow up should you notice any new or changing pre-existing skin lesion.    9. Lentigo  Scattered tan macules in sun-exposed areas.    These are benign skin lesions due to sun exposure. They will darken in response to sun exposure. They should be monitored for change in size, shape or color.  These lesions can be treated cosmetically with topical creams, liquid nitrogen and a variety of lasers.    10. Skin tag (2)  Left Axilla, Right Upper Eyelid  Fleshy, skin-colored sessile and pedunculated papules.     Benign growths that most commonly occur in areas of friction and rubbing, specifically the neck, axilla, and inguinal creases. No treatment is necessary. If lesions are symptomatic, they can be removed, however regardless of symptoms some insurances may not cover this procedure.    11. Encounter for screening for  malignant neoplasm of skin  No suspicious lesions noted on examination today    The risk of chronic, cumulative sun damage and risk of development of skin cancer was reviewed today.   The importance of sun protection was reviewed: including the use of a broad spectrum sunscreen of at least SPF 30 that protects against both UVA/UVB rays, with ingredients such as Zinc oxide or titanium dioxide, wearing sun protective clothing and sun avoidance. We reviewed the warning signs of non-melanoma skin cancer and ABCDEs of melanoma  Please follow up should you notice any new or changing pre-existing skin lesion.        Follow up in 1-2 years for FBSE

## 2025-04-01 ENCOUNTER — APPOINTMENT (OUTPATIENT)
Dept: PRIMARY CARE | Facility: CLINIC | Age: 50
End: 2025-04-01
Payer: COMMERCIAL

## 2025-04-08 ENCOUNTER — APPOINTMENT (OUTPATIENT)
Dept: PRIMARY CARE | Facility: CLINIC | Age: 50
End: 2025-04-08
Payer: COMMERCIAL

## 2025-05-02 ENCOUNTER — APPOINTMENT (OUTPATIENT)
Dept: PRIMARY CARE | Facility: CLINIC | Age: 50
End: 2025-05-02
Payer: COMMERCIAL

## 2025-05-13 ENCOUNTER — APPOINTMENT (OUTPATIENT)
Dept: PRIMARY CARE | Facility: CLINIC | Age: 50
End: 2025-05-13
Payer: COMMERCIAL

## 2025-05-13 VITALS
HEIGHT: 66 IN | TEMPERATURE: 97.7 F | DIASTOLIC BLOOD PRESSURE: 82 MMHG | HEART RATE: 103 BPM | WEIGHT: 229 LBS | SYSTOLIC BLOOD PRESSURE: 129 MMHG | BODY MASS INDEX: 36.8 KG/M2

## 2025-05-13 DIAGNOSIS — F41.9 ANXIETY: ICD-10-CM

## 2025-05-13 DIAGNOSIS — Z23 ENCOUNTER FOR IMMUNIZATION: ICD-10-CM

## 2025-05-13 DIAGNOSIS — E78.5 DYSLIPIDEMIA: ICD-10-CM

## 2025-05-13 DIAGNOSIS — E55.9 VITAMIN D DEFICIENCY: ICD-10-CM

## 2025-05-13 DIAGNOSIS — G43.909 MIGRAINE WITHOUT STATUS MIGRAINOSUS, NOT INTRACTABLE, UNSPECIFIED MIGRAINE TYPE: ICD-10-CM

## 2025-05-13 DIAGNOSIS — R73.01 IMPAIRED FASTING GLUCOSE: ICD-10-CM

## 2025-05-13 DIAGNOSIS — Z13.29 THYROID DISORDER SCREENING: ICD-10-CM

## 2025-05-13 DIAGNOSIS — Z00.00 HEALTH MAINTENANCE EXAMINATION: ICD-10-CM

## 2025-05-13 DIAGNOSIS — E53.8 VITAMIN B12 DEFICIENCY: Primary | ICD-10-CM

## 2025-05-13 DIAGNOSIS — I10 PRIMARY HYPERTENSION: ICD-10-CM

## 2025-05-13 DIAGNOSIS — E55.9 VITAMIN D INSUFFICIENCY: ICD-10-CM

## 2025-05-13 PROCEDURE — 3074F SYST BP LT 130 MM HG: CPT | Performed by: INTERNAL MEDICINE

## 2025-05-13 PROCEDURE — 99213 OFFICE O/P EST LOW 20 MIN: CPT | Performed by: INTERNAL MEDICINE

## 2025-05-13 PROCEDURE — 99396 PREV VISIT EST AGE 40-64: CPT | Performed by: INTERNAL MEDICINE

## 2025-05-13 PROCEDURE — 90471 IMMUNIZATION ADMIN: CPT | Performed by: INTERNAL MEDICINE

## 2025-05-13 PROCEDURE — 90750 HZV VACC RECOMBINANT IM: CPT | Performed by: INTERNAL MEDICINE

## 2025-05-13 PROCEDURE — 3079F DIAST BP 80-89 MM HG: CPT | Performed by: INTERNAL MEDICINE

## 2025-05-13 PROCEDURE — 3008F BODY MASS INDEX DOCD: CPT | Performed by: INTERNAL MEDICINE

## 2025-05-13 PROCEDURE — 1036F TOBACCO NON-USER: CPT | Performed by: INTERNAL MEDICINE

## 2025-05-13 RX ORDER — ESCITALOPRAM OXALATE 10 MG/1
10 TABLET ORAL DAILY
Qty: 90 TABLET | Refills: 1 | Status: SHIPPED | OUTPATIENT
Start: 2025-05-13

## 2025-05-13 ASSESSMENT — ENCOUNTER SYMPTOMS
NAUSEA: 0
LIGHT-HEADEDNESS: 0
DIZZINESS: 0
CONFUSION: 0
DIARRHEA: 0
ABDOMINAL PAIN: 0
VOMITING: 0
SHORTNESS OF BREATH: 0
SORE THROAT: 0
HEMATURIA: 0
PALPITATIONS: 0
BACK PAIN: 1
COUGH: 0
FEVER: 0
BLOOD IN STOOL: 0
AGITATION: 0
CHILLS: 0
DYSURIA: 0

## 2025-05-13 ASSESSMENT — PATIENT HEALTH QUESTIONNAIRE - PHQ9
1. LITTLE INTEREST OR PLEASURE IN DOING THINGS: NOT AT ALL
SUM OF ALL RESPONSES TO PHQ9 QUESTIONS 1 AND 2: 0
2. FEELING DOWN, DEPRESSED OR HOPELESS: NOT AT ALL

## 2025-05-13 NOTE — PROGRESS NOTES
Roxanne Stewart is a 49 y.o. female and is here for a comprehensive physical exam. The patient reports problems - recent diagnosis of crohn's .    Do you take any herbs or supplements that were not prescribed by a doctor? {yes/no/not asked:9010}  Are you taking calcium supplements? {yes/no:109879}  Are you taking aspirin daily? {yes/no:732915}      History:  {gu history select gender:15234}    Do you have pain that bothers you in your daily life? {yes/no/not asked:9010}  Review of Systems     Objective   Physical Exam    Assessment/Plan   Healthy female exam. ***     1. ***  2. Patient Counseling:  --Nutrition: Stressed importance of moderation in sodium/caffeine intake, saturated fat and cholesterol, caloric balance, sufficient intake of fresh fruits, vegetables, fiber, calcium, iron, and 1 mg of folate supplement per day (for females capable of pregnancy).  --Discussed the issue of estrogen replacement, calcium supplement, and the daily use of baby aspirin.  --Exercise: Stressed the importance of regular exercise.   --Substance Abuse: Discussed cessation/primary prevention of tobacco, alcohol, or other drug use; driving or other dangerous activities under the influence; availability of treatment for abuse.    --Sexuality: Discussed sexually transmitted diseases, partner selection, use of condoms, avoidance of unintended pregnancy  and contraceptive alternatives.   --Injury prevention: Discussed safety belts, safety helmets, smoke detector, smoking near bedding or upholstery.   --Dental health: Discussed importance of regular tooth brushing, flossing, and dental visits.  --Immunizations reviewed.  --Discussed benefits of screening colonoscopy.  --After hours service discussed with patient  3. Discussed the patient's BMI with her.  The BMI {BMI plan ([facility] 421[facility]60900}  4. Follow up {follow-up interval:20041}

## 2025-05-13 NOTE — PROGRESS NOTES
Subjective   Tanisha Stewart is a 49 y.o. female and is here for a comprehensive physical exam. The patient reports problems - crohn's disease, sciatica  .    Do you take any herbs or supplements that were not prescribed by a doctor? no  Are you taking calcium supplements? no  Are you taking aspirin daily? no     History of Present Illness  Tanisha Stewart is a 49 year old female who presents for an annual wellness visit.    Recently diagnosed with Crohn's disease during a routine checkup before planned bariatric surgery. Previously diagnosed with colitis, she had no issues until starting Skyrizi for Crohn's disease, which has led to gastrointestinal symptoms. Her surgery was postponed due to this new diagnosis, and she has a follow-up appointment scheduled for tomorrow with GI.    She has a history of sleep apnea and uses a CPAP machine almost every night. Occasionally, she uses a cool mist machine to aid her sleep.    She experiences migraines and was previously on Qulipta, which was effective until her insurance stopped covering it. She now has 2-3 migraines per month, down from 4-5. Chiropractic treatment for neck tension has helped reduce migraine frequency.    She takes paroxetine for anxiety, which also helps with night sweats. She is also taking lexapro. She takes vitamins for previously low vitamin D and B12 levels. She has a history of high blood pressure but is not currently on medication. She monitors her blood pressure at home and reports it has been stable.    She reports back pain and sciatica that started about a month ago, possibly related to her work activities. The pain is on the right side and radiates down her leg, described as a shooting pain. No weakness or numbness in her legs.  Denies any loss of bowel or bladder control.  No saddle anesthesia.    Her family history includes breast cancer, thyroid cancer, and diabetes. She has a sister with thyroid issues and a family history of  Hashimoto's disease. She is up to date on her mammogram and is due for a Pap smear next year.    She received her first shingles vaccine dose after her  had shingles, but has not yet received the second dose.    Do you have pain that bothers you in your daily life? yes  Review of Systems   Constitutional:  Negative for chills and fever.   HENT:  Negative for sore throat.    Eyes:  Negative for visual disturbance.   Respiratory:  Negative for cough and shortness of breath.    Cardiovascular:  Negative for chest pain, palpitations and leg swelling.   Gastrointestinal:  Negative for abdominal pain, blood in stool, diarrhea, nausea and vomiting.   Genitourinary:  Negative for dysuria and hematuria.   Musculoskeletal:  Positive for back pain.   Skin:  Negative for rash.   Neurological:  Negative for dizziness, syncope and light-headedness.   Psychiatric/Behavioral:  Negative for agitation and confusion.         Objective   Physical Exam  Vitals and nursing note reviewed.   Constitutional:       General: She is not in acute distress.     Appearance: Normal appearance. She is obese. She is not ill-appearing, toxic-appearing or diaphoretic.   HENT:      Head: Normocephalic and atraumatic.      Nose: No rhinorrhea.      Mouth/Throat:      Mouth: Mucous membranes are moist.      Pharynx: Oropharynx is clear.   Eyes:      Extraocular Movements: Extraocular movements intact.      Pupils: Pupils are equal, round, and reactive to light.   Cardiovascular:      Rate and Rhythm: Normal rate and regular rhythm.      Heart sounds: Normal heart sounds.   Pulmonary:      Effort: Pulmonary effort is normal. No respiratory distress.      Breath sounds: Normal breath sounds. No wheezing, rhonchi or rales.   Abdominal:      General: There is no distension.      Palpations: Abdomen is soft. There is no mass.      Tenderness: There is no abdominal tenderness. There is no guarding.   Musculoskeletal:         General: No tenderness (No  midline lower back tenderness).      Cervical back: Neck supple.      Right lower leg: No edema.      Left lower leg: No edema.   Skin:     General: Skin is warm and dry.      Coloration: Skin is not pale.      Findings: No rash.   Neurological:      General: No focal deficit present.      Mental Status: She is alert and oriented to person, place, and time. Mental status is at baseline.      Cranial Nerves: No cranial nerve deficit.   Psychiatric:         Mood and Affect: Mood normal.         Behavior: Behavior normal.         Thought Content: Thought content normal.         Judgment: Judgment normal.       Physical Exam  CHEST: Lungs clear to auscultation.  EXTREMITIES: No significant edema.  MUSCULOSKELETAL: Normal strength in lower extremities bilaterally. Tenderness in right lower back paraspinal muscles  Assessment/Plan   Assessment & Plan  Crohn's disease  Recently diagnosed during a routine checkup for bariatric surgery. Currently on Skyrizi treatment and experiencing gastrointestinal symptoms. Follow-up appointment scheduled for tomorrow to assess disease status.  - Continue Skyrizi treatment  - Attend follow-up appointment with gastroenterologist    Sciatica  New onset with shooting pain down the right side, likely related to occupational activities. No weakness or loss of bowel/bladder control. Considering conservative management initially.  - Use ibuprofen for pain management  -Consider provoking factors at work and change routine based upon provoking activities.  - Consider x-ray and physical therapy if symptoms persist    Migraine  Experiencing 2-3 migraines per month. Previously managed with Qulipta, which is no longer covered by insurance. Considering Nurtec as an alternative due to potential better insurance coverage and benefits, including disintegrating formulation for ease of use.  - Provide samples of Nurtec for trial  - Evaluate insurance coverage for Nurtec  - Consider propranolol if migraines  persist and blood pressure management is needed    Anxiety disorder  Managed with escitalopram and paroxetine. Discussed discontinuing paroxetine to reduce medication burden, as both medications have similar effects. Consideration given to managing night sweats and anxiety symptoms with a single medication.  - Continue escitalopram  - Consider discontinuing paroxetine and monitor for return of symptoms  - Reassess if night sweats or anxiety symptoms worsen    Sleep apnea  Uses CPAP machine almost every night. Considering the potential impact of weight loss from upcoming bariatric surgery on management.  - Continue using CPAP machine  - Reassess management post-bariatric surgery    General Health Maintenance  Due for routine yearly labs. Up to date on colonoscopy and mammogram. Needs second dose of Shingrix vaccine. Discussed the importance of timely vaccination to ensure effectiveness. Pneumonia vaccine to be considered at next visit.  - Order routine yearly labs  - Administer second dose of Shingrix vaccine  - Plan for pneumonia vaccine at next visit  Healthy female exam.      1.  Patient up-to-date on age and gender recommended screening.  Patient up-to-date on age and gender recommended vaccinations exception of shingles vaccine which will be given her second dose today and pneumonia vaccine which she defers until later time  2. Patient Counseling:  --Nutrition: Stressed importance of moderation in sodium/caffeine intake, saturated fat and cholesterol, caloric balance, sufficient intake of fresh fruits, vegetables, fiber, calcium, iron, and 1 mg of folate supplement per day (for females capable of pregnancy).  --Discussed the issue of estrogen replacement, calcium supplement, and the daily use of baby aspirin.  --Exercise: Stressed the importance of regular exercise.   --Substance Abuse: Discussed cessation/primary prevention of tobacco, alcohol, or other drug use; driving or other dangerous activities under the  influence; availability of treatment for abuse.    --Sexuality: Discussed sexually transmitted diseases, partner selection, use of condoms, avoidance of unintended pregnancy  and contraceptive alternatives.   --Injury prevention: Discussed safety belts, safety helmets, smoke detector, smoking near bedding or upholstery.   --Dental health: Discussed importance of regular tooth brushing, flossing, and dental visits.  --Immunizations reviewed.  --Discussed benefits of screening colonoscopy.  --After hours service discussed with patient  3. Discussed the patient's BMI with her.  The BMI is above average. The patient received Provided instructions on dietary changes  Provided instructions on exercise  Advised to Increase physical activity because they have an above normal BMI.  4. Follow up in 6 months    Tres Walker DO     This medical note was created with the assistance of artificial intelligence (AI) for documentation purposes. The content has been reviewed and confirmed by the healthcare provider for accuracy and completeness. Patient consented to the use of audio recording and use of AI during their visit.

## 2025-05-15 ENCOUNTER — TELEPHONE (OUTPATIENT)
Dept: PRIMARY CARE | Facility: CLINIC | Age: 50
End: 2025-05-15
Payer: COMMERCIAL

## 2025-05-15 NOTE — TELEPHONE ENCOUNTER
Nurtec was denied.  See denial letter in chart.  Patent has to try 2- Triptan meds.  She has been on Sumatriptan

## 2025-05-16 DIAGNOSIS — I83.813 VARICOSE VEINS OF BILATERAL LOWER EXTREMITIES WITH PAIN: ICD-10-CM

## 2025-05-16 DIAGNOSIS — G43.909 MIGRAINE WITHOUT STATUS MIGRAINOSUS, NOT INTRACTABLE, UNSPECIFIED MIGRAINE TYPE: Primary | ICD-10-CM

## 2025-05-16 RX ORDER — RIZATRIPTAN BENZOATE 10 MG/1
10 TABLET ORAL ONCE AS NEEDED
Qty: 9 TABLET | Refills: 2 | Status: SHIPPED | OUTPATIENT
Start: 2025-05-16 | End: 2025-06-15

## 2025-05-22 ENCOUNTER — APPOINTMENT (OUTPATIENT)
Dept: PODIATRY | Facility: CLINIC | Age: 50
End: 2025-05-22
Payer: COMMERCIAL

## 2025-07-25 DIAGNOSIS — I10 PRIMARY HYPERTENSION: ICD-10-CM

## 2025-07-25 DIAGNOSIS — E78.5 DYSLIPIDEMIA: Primary | ICD-10-CM

## 2025-07-30 RX ORDER — ATENOLOL 25 MG/1
25 TABLET ORAL DAILY
Qty: 90 TABLET | Refills: 1 | Status: SHIPPED | OUTPATIENT
Start: 2025-07-30 | End: 2026-01-26

## 2025-08-15 ENCOUNTER — APPOINTMENT (OUTPATIENT)
Dept: PODIATRY | Facility: CLINIC | Age: 50
End: 2025-08-15
Payer: COMMERCIAL

## 2025-08-18 ENCOUNTER — HOSPITAL ENCOUNTER (OUTPATIENT)
Dept: RADIOLOGY | Facility: CLINIC | Age: 50
Discharge: HOME | End: 2025-08-18
Payer: COMMERCIAL

## 2025-08-18 DIAGNOSIS — M25.572 CHRONIC PAIN OF BOTH ANKLES: ICD-10-CM

## 2025-08-18 DIAGNOSIS — M25.571 CHRONIC PAIN OF BOTH ANKLES: ICD-10-CM

## 2025-08-18 DIAGNOSIS — G89.29 CHRONIC PAIN OF BOTH ANKLES: ICD-10-CM

## 2025-08-18 PROCEDURE — 73610 X-RAY EXAM OF ANKLE: CPT | Mod: LT

## 2025-08-18 PROCEDURE — 73610 X-RAY EXAM OF ANKLE: CPT | Mod: RT

## 2025-08-18 PROCEDURE — 73610 X-RAY EXAM OF ANKLE: CPT | Mod: BILATERAL PROCEDURE | Performed by: RADIOLOGY

## 2025-08-18 PROCEDURE — 73630 X-RAY EXAM OF FOOT: CPT | Mod: 50

## 2025-08-18 PROCEDURE — 73630 X-RAY EXAM OF FOOT: CPT | Mod: BILATERAL PROCEDURE | Performed by: RADIOLOGY

## 2025-08-22 ENCOUNTER — APPOINTMENT (OUTPATIENT)
Dept: OBSTETRICS AND GYNECOLOGY | Facility: CLINIC | Age: 50
End: 2025-08-22
Payer: COMMERCIAL

## 2025-08-22 ENCOUNTER — OFFICE VISIT (OUTPATIENT)
Dept: PODIATRY | Facility: CLINIC | Age: 50
End: 2025-08-22
Payer: COMMERCIAL

## 2025-08-22 DIAGNOSIS — M25.571 CHRONIC PAIN OF BOTH ANKLES: Primary | ICD-10-CM

## 2025-08-22 DIAGNOSIS — M21.42 PES PLANUS OF BOTH FEET: ICD-10-CM

## 2025-08-22 DIAGNOSIS — M54.50 LUMBAR SPINE PAINFUL ON MOVEMENT: ICD-10-CM

## 2025-08-22 DIAGNOSIS — M76.829 PTTD (POSTERIOR TIBIAL TENDON DYSFUNCTION): ICD-10-CM

## 2025-08-22 DIAGNOSIS — M54.31 BILATERAL SCIATICA: ICD-10-CM

## 2025-08-22 DIAGNOSIS — R20.2 POSITIVE TINEL SIGN: ICD-10-CM

## 2025-08-22 DIAGNOSIS — G89.29 CHRONIC PAIN OF BOTH ANKLES: Primary | ICD-10-CM

## 2025-08-22 DIAGNOSIS — M54.32 BILATERAL SCIATICA: ICD-10-CM

## 2025-08-22 DIAGNOSIS — M21.41 PES PLANUS OF BOTH FEET: ICD-10-CM

## 2025-08-22 DIAGNOSIS — M25.572 CHRONIC PAIN OF BOTH ANKLES: Primary | ICD-10-CM

## 2025-08-22 PROCEDURE — 1036F TOBACCO NON-USER: CPT | Performed by: PODIATRIST

## 2025-08-22 PROCEDURE — 99204 OFFICE O/P NEW MOD 45 MIN: CPT | Performed by: PODIATRIST

## 2025-08-22 RX ORDER — DICLOFENAC POTASSIUM 50 MG/1
50 TABLET, FILM COATED ORAL 2 TIMES DAILY
Qty: 60 TABLET | Refills: 0 | Status: SHIPPED | OUTPATIENT
Start: 2025-08-22 | End: 2025-09-21

## 2025-09-24 ENCOUNTER — APPOINTMENT (OUTPATIENT)
Dept: PODIATRY | Facility: CLINIC | Age: 50
End: 2025-09-24
Payer: COMMERCIAL

## 2025-11-17 ENCOUNTER — APPOINTMENT (OUTPATIENT)
Dept: GASTROENTEROLOGY | Facility: CLINIC | Age: 50
End: 2025-11-17
Payer: COMMERCIAL

## 2025-11-18 ENCOUNTER — APPOINTMENT (OUTPATIENT)
Dept: PRIMARY CARE | Facility: CLINIC | Age: 50
End: 2025-11-18
Payer: COMMERCIAL

## 2025-11-25 ENCOUNTER — APPOINTMENT (OUTPATIENT)
Dept: OBSTETRICS AND GYNECOLOGY | Facility: CLINIC | Age: 50
End: 2025-11-25
Payer: COMMERCIAL